# Patient Record
Sex: FEMALE | Race: WHITE | Employment: PART TIME | URBAN - METROPOLITAN AREA
[De-identification: names, ages, dates, MRNs, and addresses within clinical notes are randomized per-mention and may not be internally consistent; named-entity substitution may affect disease eponyms.]

---

## 2019-03-01 ENCOUNTER — HOSPITAL ENCOUNTER (INPATIENT)
Age: 34
LOS: 3 days | Discharge: HOME | DRG: 854 | End: 2019-03-04
Attending: EMERGENCY MEDICINE | Admitting: INTERNAL MEDICINE
Payer: COMMERCIAL

## 2019-03-01 ENCOUNTER — APPOINTMENT (OUTPATIENT)
Dept: CT IMAGING | Age: 34
DRG: 854 | End: 2019-03-01
Attending: EMERGENCY MEDICINE
Payer: COMMERCIAL

## 2019-03-01 DIAGNOSIS — N10 PYELONEPHRITIS, ACUTE: Primary | ICD-10-CM

## 2019-03-01 DIAGNOSIS — N20.1 URETERAL CALCULUS, RIGHT: ICD-10-CM

## 2019-03-01 PROBLEM — A41.9 SEPSIS (HCC): Status: ACTIVE | Noted: 2019-03-01

## 2019-03-01 LAB
ALBUMIN SERPL-MCNC: 3.8 G/DL (ref 3.5–5)
ALBUMIN/GLOB SERPL: 1 {RATIO} (ref 1.1–2.2)
ALP SERPL-CCNC: 52 U/L (ref 45–117)
ALT SERPL-CCNC: 28 U/L (ref 12–78)
ANION GAP SERPL CALC-SCNC: 9 MMOL/L (ref 5–15)
APPEARANCE UR: ABNORMAL
AST SERPL-CCNC: 15 U/L (ref 15–37)
BACTERIA URNS QL MICRO: ABNORMAL /HPF
BASOPHILS # BLD: 0.1 K/UL (ref 0–0.1)
BASOPHILS NFR BLD: 0 % (ref 0–1)
BILIRUB SERPL-MCNC: 0.4 MG/DL (ref 0.2–1)
BILIRUB UR QL: NEGATIVE
BUN SERPL-MCNC: 15 MG/DL (ref 6–20)
BUN/CREAT SERPL: 15 (ref 12–20)
CALCIUM SERPL-MCNC: 8.8 MG/DL (ref 8.5–10.1)
CHLORIDE SERPL-SCNC: 106 MMOL/L (ref 97–108)
CO2 SERPL-SCNC: 22 MMOL/L (ref 21–32)
COLOR UR: ABNORMAL
CREAT SERPL-MCNC: 1.03 MG/DL (ref 0.55–1.02)
DIFFERENTIAL METHOD BLD: ABNORMAL
EOSINOPHIL # BLD: 0.1 K/UL (ref 0–0.4)
EOSINOPHIL NFR BLD: 1 % (ref 0–7)
EPITH CASTS URNS QL MICRO: ABNORMAL /LPF
ERYTHROCYTE [DISTWIDTH] IN BLOOD BY AUTOMATED COUNT: 13.4 % (ref 11.5–14.5)
GLOBULIN SER CALC-MCNC: 4 G/DL (ref 2–4)
GLUCOSE SERPL-MCNC: 110 MG/DL (ref 65–100)
GLUCOSE UR STRIP.AUTO-MCNC: NEGATIVE MG/DL
HCG UR QL: NEGATIVE
HCT VFR BLD AUTO: 42.2 % (ref 35–47)
HGB BLD-MCNC: 14.6 G/DL (ref 11.5–16)
HGB UR QL STRIP: ABNORMAL
IMM GRANULOCYTES # BLD AUTO: 0.1 K/UL (ref 0–0.04)
IMM GRANULOCYTES NFR BLD AUTO: 0 % (ref 0–0.5)
KETONES UR QL STRIP.AUTO: >80 MG/DL
LACTATE SERPL-SCNC: 1 MMOL/L (ref 0.4–2)
LEUKOCYTE ESTERASE UR QL STRIP.AUTO: ABNORMAL
LYMPHOCYTES # BLD: 2.4 K/UL (ref 0.8–3.5)
LYMPHOCYTES NFR BLD: 14 % (ref 12–49)
MCH RBC QN AUTO: 29.8 PG (ref 26–34)
MCHC RBC AUTO-ENTMCNC: 34.6 G/DL (ref 30–36.5)
MCV RBC AUTO: 86.1 FL (ref 80–99)
MONOCYTES # BLD: 0.9 K/UL (ref 0–1)
MONOCYTES NFR BLD: 5 % (ref 5–13)
MUCOUS THREADS URNS QL MICRO: ABNORMAL /LPF
NEUTS SEG # BLD: 13.9 K/UL (ref 1.8–8)
NEUTS SEG NFR BLD: 80 % (ref 32–75)
NITRITE UR QL STRIP.AUTO: NEGATIVE
NRBC # BLD: 0 K/UL (ref 0–0.01)
NRBC BLD-RTO: 0 PER 100 WBC
PH UR STRIP: 6 [PH] (ref 5–8)
PLATELET # BLD AUTO: 368 K/UL (ref 150–400)
PMV BLD AUTO: 11.8 FL (ref 8.9–12.9)
POTASSIUM SERPL-SCNC: 3.5 MMOL/L (ref 3.5–5.1)
PROT SERPL-MCNC: 7.8 G/DL (ref 6.4–8.2)
PROT UR STRIP-MCNC: 30 MG/DL
RBC # BLD AUTO: 4.9 M/UL (ref 3.8–5.2)
RBC #/AREA URNS HPF: >100 /HPF (ref 0–5)
SODIUM SERPL-SCNC: 137 MMOL/L (ref 136–145)
SP GR UR REFRACTOMETRY: 1.03 (ref 1–1.03)
UA: UC IF INDICATED,UAUC: ABNORMAL
UROBILINOGEN UR QL STRIP.AUTO: 0.2 EU/DL (ref 0.2–1)
WBC # BLD AUTO: 17.5 K/UL (ref 3.6–11)
WBC URNS QL MICRO: ABNORMAL /HPF (ref 0–4)

## 2019-03-01 PROCEDURE — 74011250636 HC RX REV CODE- 250/636

## 2019-03-01 PROCEDURE — 65660000000 HC RM CCU STEPDOWN

## 2019-03-01 PROCEDURE — 74011250636 HC RX REV CODE- 250/636: Performed by: PHYSICIAN ASSISTANT

## 2019-03-01 PROCEDURE — 74011250636 HC RX REV CODE- 250/636: Performed by: EMERGENCY MEDICINE

## 2019-03-01 PROCEDURE — 81025 URINE PREGNANCY TEST: CPT

## 2019-03-01 PROCEDURE — 85025 COMPLETE CBC W/AUTO DIFF WBC: CPT

## 2019-03-01 PROCEDURE — 74011636320 HC RX REV CODE- 636/320: Performed by: EMERGENCY MEDICINE

## 2019-03-01 PROCEDURE — 87086 URINE CULTURE/COLONY COUNT: CPT

## 2019-03-01 PROCEDURE — 83605 ASSAY OF LACTIC ACID: CPT

## 2019-03-01 PROCEDURE — 96361 HYDRATE IV INFUSION ADD-ON: CPT

## 2019-03-01 PROCEDURE — 99285 EMERGENCY DEPT VISIT HI MDM: CPT

## 2019-03-01 PROCEDURE — 36415 COLL VENOUS BLD VENIPUNCTURE: CPT

## 2019-03-01 PROCEDURE — 96376 TX/PRO/DX INJ SAME DRUG ADON: CPT

## 2019-03-01 PROCEDURE — 96365 THER/PROPH/DIAG IV INF INIT: CPT

## 2019-03-01 PROCEDURE — 96375 TX/PRO/DX INJ NEW DRUG ADDON: CPT

## 2019-03-01 PROCEDURE — 74177 CT ABD & PELVIS W/CONTRAST: CPT

## 2019-03-01 PROCEDURE — 80053 COMPREHEN METABOLIC PANEL: CPT

## 2019-03-01 PROCEDURE — 81001 URINALYSIS AUTO W/SCOPE: CPT

## 2019-03-01 RX ORDER — HYDROMORPHONE HYDROCHLORIDE 1 MG/ML
0.5 INJECTION, SOLUTION INTRAMUSCULAR; INTRAVENOUS; SUBCUTANEOUS
Status: DISCONTINUED | OUTPATIENT
Start: 2019-03-01 | End: 2019-03-04 | Stop reason: HOSPADM

## 2019-03-01 RX ORDER — MORPHINE SULFATE 4 MG/ML
INJECTION INTRAVENOUS
Status: DISCONTINUED
Start: 2019-03-01 | End: 2019-03-02

## 2019-03-01 RX ORDER — OXYCODONE AND ACETAMINOPHEN 5; 325 MG/1; MG/1
1 TABLET ORAL
Status: DISCONTINUED | OUTPATIENT
Start: 2019-03-01 | End: 2019-03-04 | Stop reason: HOSPADM

## 2019-03-01 RX ORDER — SODIUM CHLORIDE 0.9 % (FLUSH) 0.9 %
10 SYRINGE (ML) INJECTION
Status: COMPLETED | OUTPATIENT
Start: 2019-03-01 | End: 2019-03-01

## 2019-03-01 RX ORDER — ACETAMINOPHEN 325 MG/1
650 TABLET ORAL
Status: DISCONTINUED | OUTPATIENT
Start: 2019-03-01 | End: 2019-03-04 | Stop reason: HOSPADM

## 2019-03-01 RX ORDER — SODIUM CHLORIDE 9 MG/ML
150 INJECTION, SOLUTION INTRAVENOUS CONTINUOUS
Status: DISCONTINUED | OUTPATIENT
Start: 2019-03-01 | End: 2019-03-03

## 2019-03-01 RX ORDER — FACIAL-BODY WIPES
10 EACH TOPICAL DAILY PRN
Status: DISCONTINUED | OUTPATIENT
Start: 2019-03-01 | End: 2019-03-04 | Stop reason: HOSPADM

## 2019-03-01 RX ORDER — MORPHINE SULFATE 2 MG/ML
4 INJECTION, SOLUTION INTRAMUSCULAR; INTRAVENOUS
Status: COMPLETED | OUTPATIENT
Start: 2019-03-01 | End: 2019-03-01

## 2019-03-01 RX ORDER — ENOXAPARIN SODIUM 100 MG/ML
40 INJECTION SUBCUTANEOUS EVERY 24 HOURS
Status: DISCONTINUED | OUTPATIENT
Start: 2019-03-02 | End: 2019-03-02

## 2019-03-01 RX ORDER — ONDANSETRON 2 MG/ML
4 INJECTION INTRAMUSCULAR; INTRAVENOUS
Status: COMPLETED | OUTPATIENT
Start: 2019-03-01 | End: 2019-03-01

## 2019-03-01 RX ORDER — ONDANSETRON 2 MG/ML
4 INJECTION INTRAMUSCULAR; INTRAVENOUS
Status: DISCONTINUED | OUTPATIENT
Start: 2019-03-01 | End: 2019-03-04 | Stop reason: HOSPADM

## 2019-03-01 RX ORDER — KETOROLAC TROMETHAMINE 30 MG/ML
30 INJECTION, SOLUTION INTRAMUSCULAR; INTRAVENOUS
Status: COMPLETED | OUTPATIENT
Start: 2019-03-01 | End: 2019-03-01

## 2019-03-01 RX ORDER — LEVOFLOXACIN 5 MG/ML
750 INJECTION, SOLUTION INTRAVENOUS
Status: COMPLETED | OUTPATIENT
Start: 2019-03-01 | End: 2019-03-01

## 2019-03-01 RX ORDER — NALOXONE HYDROCHLORIDE 0.4 MG/ML
0.4 INJECTION, SOLUTION INTRAMUSCULAR; INTRAVENOUS; SUBCUTANEOUS AS NEEDED
Status: DISCONTINUED | OUTPATIENT
Start: 2019-03-01 | End: 2019-03-04 | Stop reason: HOSPADM

## 2019-03-01 RX ORDER — SODIUM CHLORIDE 0.9 % (FLUSH) 0.9 %
5-40 SYRINGE (ML) INJECTION AS NEEDED
Status: DISCONTINUED | OUTPATIENT
Start: 2019-03-01 | End: 2019-03-04 | Stop reason: HOSPADM

## 2019-03-01 RX ORDER — SODIUM CHLORIDE 0.9 % (FLUSH) 0.9 %
5-40 SYRINGE (ML) INJECTION EVERY 8 HOURS
Status: DISCONTINUED | OUTPATIENT
Start: 2019-03-01 | End: 2019-03-04 | Stop reason: HOSPADM

## 2019-03-01 RX ADMIN — LEVOFLOXACIN 750 MG: 5 INJECTION, SOLUTION INTRAVENOUS at 21:10

## 2019-03-01 RX ADMIN — KETOROLAC TROMETHAMINE 30 MG: 30 INJECTION, SOLUTION INTRAMUSCULAR; INTRAVENOUS at 19:42

## 2019-03-01 RX ADMIN — SODIUM CHLORIDE 150 ML/HR: 900 INJECTION, SOLUTION INTRAVENOUS at 23:15

## 2019-03-01 RX ADMIN — Medication 10 ML: at 22:19

## 2019-03-01 RX ADMIN — ONDANSETRON 4 MG: 2 INJECTION INTRAMUSCULAR; INTRAVENOUS at 20:00

## 2019-03-01 RX ADMIN — SODIUM CHLORIDE 1000 ML: 900 INJECTION, SOLUTION INTRAVENOUS at 19:58

## 2019-03-01 RX ADMIN — MORPHINE SULFATE 4 MG: 2 INJECTION, SOLUTION INTRAMUSCULAR; INTRAVENOUS at 21:27

## 2019-03-01 RX ADMIN — IOPAMIDOL 100 ML: 755 INJECTION, SOLUTION INTRAVENOUS at 22:19

## 2019-03-01 RX ADMIN — MORPHINE SULFATE 4 MG: 2 INJECTION, SOLUTION INTRAMUSCULAR; INTRAVENOUS at 20:01

## 2019-03-01 NOTE — LETTER
NOTIFICATION RETURN TO WORK / SCHOOL 
 
3/4/2019 7:52 AM 
 
Ms. Toni Skelton [address] [facility] [id number] To Whom It May Concern: 
 
Toin Skelton is currently receiving care at [facility]. She is medically cleared to return to work on March 11, 2019. If there are questions or concerns please have the patient contact our office. Sincerely, Toshia Wilson MD 
Hospitalist 
[facility]

## 2019-03-02 ENCOUNTER — ANESTHESIA (OUTPATIENT)
Dept: SURGERY | Age: 34
DRG: 854 | End: 2019-03-02
Payer: COMMERCIAL

## 2019-03-02 ENCOUNTER — ANESTHESIA EVENT (OUTPATIENT)
Dept: SURGERY | Age: 34
DRG: 854 | End: 2019-03-02
Payer: COMMERCIAL

## 2019-03-02 ENCOUNTER — APPOINTMENT (OUTPATIENT)
Dept: GENERAL RADIOLOGY | Age: 34
DRG: 854 | End: 2019-03-02
Attending: UROLOGY
Payer: COMMERCIAL

## 2019-03-02 LAB
ALBUMIN SERPL-MCNC: 3.2 G/DL (ref 3.5–5)
ALBUMIN/GLOB SERPL: 0.9 {RATIO} (ref 1.1–2.2)
ALP SERPL-CCNC: 42 U/L (ref 45–117)
ALT SERPL-CCNC: 22 U/L (ref 12–78)
ANION GAP SERPL CALC-SCNC: 12 MMOL/L (ref 5–15)
AST SERPL-CCNC: 15 U/L (ref 15–37)
BASOPHILS # BLD: 0.1 K/UL (ref 0–0.1)
BASOPHILS NFR BLD: 0 % (ref 0–1)
BILIRUB SERPL-MCNC: 0.4 MG/DL (ref 0.2–1)
BUN SERPL-MCNC: 14 MG/DL (ref 6–20)
BUN/CREAT SERPL: 14 (ref 12–20)
CALCIUM SERPL-MCNC: 8.1 MG/DL (ref 8.5–10.1)
CHLORIDE SERPL-SCNC: 108 MMOL/L (ref 97–108)
CO2 SERPL-SCNC: 19 MMOL/L (ref 21–32)
CREAT SERPL-MCNC: 1 MG/DL (ref 0.55–1.02)
DIFFERENTIAL METHOD BLD: ABNORMAL
EOSINOPHIL # BLD: 0.1 K/UL (ref 0–0.4)
EOSINOPHIL NFR BLD: 1 % (ref 0–7)
ERYTHROCYTE [DISTWIDTH] IN BLOOD BY AUTOMATED COUNT: 13.5 % (ref 11.5–14.5)
GLOBULIN SER CALC-MCNC: 3.5 G/DL (ref 2–4)
GLUCOSE SERPL-MCNC: 104 MG/DL (ref 65–100)
HCT VFR BLD AUTO: 38.6 % (ref 35–47)
HGB BLD-MCNC: 13.1 G/DL (ref 11.5–16)
IMM GRANULOCYTES # BLD AUTO: 0.1 K/UL (ref 0–0.04)
IMM GRANULOCYTES NFR BLD AUTO: 0 % (ref 0–0.5)
LYMPHOCYTES # BLD: 2.6 K/UL (ref 0.8–3.5)
LYMPHOCYTES NFR BLD: 14 % (ref 12–49)
MCH RBC QN AUTO: 30.1 PG (ref 26–34)
MCHC RBC AUTO-ENTMCNC: 33.9 G/DL (ref 30–36.5)
MCV RBC AUTO: 88.7 FL (ref 80–99)
MONOCYTES # BLD: 1.6 K/UL (ref 0–1)
MONOCYTES NFR BLD: 8 % (ref 5–13)
NEUTS SEG # BLD: 14.4 K/UL (ref 1.8–8)
NEUTS SEG NFR BLD: 77 % (ref 32–75)
NRBC # BLD: 0 K/UL (ref 0–0.01)
NRBC BLD-RTO: 0 PER 100 WBC
PLATELET # BLD AUTO: 298 K/UL (ref 150–400)
PMV BLD AUTO: 11.7 FL (ref 8.9–12.9)
POTASSIUM SERPL-SCNC: 3.6 MMOL/L (ref 3.5–5.1)
PROT SERPL-MCNC: 6.7 G/DL (ref 6.4–8.2)
RBC # BLD AUTO: 4.35 M/UL (ref 3.8–5.2)
SODIUM SERPL-SCNC: 139 MMOL/L (ref 136–145)
WBC # BLD AUTO: 18.9 K/UL (ref 3.6–11)

## 2019-03-02 PROCEDURE — C2617 STENT, NON-COR, TEM W/O DEL: HCPCS | Performed by: UROLOGY

## 2019-03-02 PROCEDURE — 94760 N-INVAS EAR/PLS OXIMETRY 1: CPT

## 2019-03-02 PROCEDURE — 77030033269 HC SLV COMPR SCD KNE2 CARD -B

## 2019-03-02 PROCEDURE — 74011250636 HC RX REV CODE- 250/636: Performed by: EMERGENCY MEDICINE

## 2019-03-02 PROCEDURE — 74011000258 HC RX REV CODE- 258: Performed by: INTERNAL MEDICINE

## 2019-03-02 PROCEDURE — 0T768DZ DILATION OF RIGHT URETER WITH INTRALUMINAL DEVICE, VIA NATURAL OR ARTIFICIAL OPENING ENDOSCOPIC: ICD-10-PCS | Performed by: UROLOGY

## 2019-03-02 PROCEDURE — 77030018832 HC SOL IRR H20 ICUM -A: Performed by: UROLOGY

## 2019-03-02 PROCEDURE — 36415 COLL VENOUS BLD VENIPUNCTURE: CPT

## 2019-03-02 PROCEDURE — 74011250636 HC RX REV CODE- 250/636: Performed by: ANESTHESIOLOGY

## 2019-03-02 PROCEDURE — 74011250636 HC RX REV CODE- 250/636

## 2019-03-02 PROCEDURE — 85025 COMPLETE CBC W/AUTO DIFF WBC: CPT

## 2019-03-02 PROCEDURE — C1769 GUIDE WIRE: HCPCS | Performed by: UROLOGY

## 2019-03-02 PROCEDURE — 76060000032 HC ANESTHESIA 0.5 TO 1 HR: Performed by: UROLOGY

## 2019-03-02 PROCEDURE — 74011250637 HC RX REV CODE- 250/637: Performed by: INTERNAL MEDICINE

## 2019-03-02 PROCEDURE — 77030011992 HC AIRWY NASOPHGL TELE -A: Performed by: ANESTHESIOLOGY

## 2019-03-02 PROCEDURE — 76210000063 HC OR PH I REC FIRST 0.5 HR: Performed by: UROLOGY

## 2019-03-02 PROCEDURE — 77030019927 HC TBNG IRR CYSTO BAXT -A: Performed by: UROLOGY

## 2019-03-02 PROCEDURE — 74430 CONTRAST X-RAY BLADDER: CPT

## 2019-03-02 PROCEDURE — 65660000000 HC RM CCU STEPDOWN

## 2019-03-02 PROCEDURE — 80053 COMPREHEN METABOLIC PANEL: CPT

## 2019-03-02 PROCEDURE — 76010000138 HC OR TIME 0.5 TO 1 HR: Performed by: UROLOGY

## 2019-03-02 PROCEDURE — 77030032490 HC SLV COMPR SCD KNE COVD -B: Performed by: UROLOGY

## 2019-03-02 PROCEDURE — 74011250636 HC RX REV CODE- 250/636: Performed by: INTERNAL MEDICINE

## 2019-03-02 RX ORDER — AMLODIPINE BESYLATE 5 MG/1
5 TABLET ORAL DAILY
Status: DISCONTINUED | OUTPATIENT
Start: 2019-03-02 | End: 2019-03-04 | Stop reason: HOSPADM

## 2019-03-02 RX ORDER — HYDROCODONE BITARTRATE AND ACETAMINOPHEN 7.5; 325 MG/1; MG/1
1-2 TABLET ORAL
Status: CANCELLED | OUTPATIENT
Start: 2019-03-02

## 2019-03-02 RX ORDER — HYDROMORPHONE HYDROCHLORIDE 1 MG/ML
0.5 INJECTION, SOLUTION INTRAMUSCULAR; INTRAVENOUS; SUBCUTANEOUS
Status: DISCONTINUED | OUTPATIENT
Start: 2019-03-02 | End: 2019-03-04 | Stop reason: HOSPADM

## 2019-03-02 RX ORDER — DESOGESTREL AND ETHINYL ESTRADIOL 0.15-0.03
1 KIT ORAL DAILY
COMMUNITY

## 2019-03-02 RX ORDER — SODIUM CHLORIDE, SODIUM LACTATE, POTASSIUM CHLORIDE, CALCIUM CHLORIDE 600; 310; 30; 20 MG/100ML; MG/100ML; MG/100ML; MG/100ML
25 INJECTION, SOLUTION INTRAVENOUS CONTINUOUS
Status: DISCONTINUED | OUTPATIENT
Start: 2019-03-02 | End: 2019-03-02 | Stop reason: HOSPADM

## 2019-03-02 RX ORDER — HYDROCHLOROTHIAZIDE 25 MG/1
25 TABLET ORAL DAILY
COMMUNITY

## 2019-03-02 RX ORDER — FENTANYL CITRATE 50 UG/ML
50 INJECTION, SOLUTION INTRAMUSCULAR; INTRAVENOUS AS NEEDED
Status: DISCONTINUED | OUTPATIENT
Start: 2019-03-02 | End: 2019-03-02 | Stop reason: HOSPADM

## 2019-03-02 RX ORDER — AMLODIPINE BESYLATE 5 MG/1
5 TABLET ORAL DAILY
Status: CANCELLED | OUTPATIENT
Start: 2019-03-02

## 2019-03-02 RX ORDER — FENTANYL CITRATE 50 UG/ML
25 INJECTION, SOLUTION INTRAMUSCULAR; INTRAVENOUS
Status: DISCONTINUED | OUTPATIENT
Start: 2019-03-02 | End: 2019-03-02 | Stop reason: HOSPADM

## 2019-03-02 RX ORDER — IPRATROPIUM BROMIDE 42 UG/1
2 SPRAY, METERED NASAL 4 TIMES DAILY
COMMUNITY

## 2019-03-02 RX ORDER — MIDAZOLAM HYDROCHLORIDE 1 MG/ML
INJECTION, SOLUTION INTRAMUSCULAR; INTRAVENOUS AS NEEDED
Status: DISCONTINUED | OUTPATIENT
Start: 2019-03-02 | End: 2019-03-02 | Stop reason: HOSPADM

## 2019-03-02 RX ORDER — ONDANSETRON 2 MG/ML
4 INJECTION INTRAMUSCULAR; INTRAVENOUS AS NEEDED
Status: DISCONTINUED | OUTPATIENT
Start: 2019-03-02 | End: 2019-03-02 | Stop reason: HOSPADM

## 2019-03-02 RX ORDER — PROPOFOL 10 MG/ML
INJECTION, EMULSION INTRAVENOUS AS NEEDED
Status: DISCONTINUED | OUTPATIENT
Start: 2019-03-02 | End: 2019-03-02 | Stop reason: HOSPADM

## 2019-03-02 RX ORDER — ALBUTEROL SULFATE 0.83 MG/ML
2.5 SOLUTION RESPIRATORY (INHALATION)
Status: DISCONTINUED | OUTPATIENT
Start: 2019-03-02 | End: 2019-03-04 | Stop reason: HOSPADM

## 2019-03-02 RX ORDER — FENTANYL CITRATE 50 UG/ML
INJECTION, SOLUTION INTRAMUSCULAR; INTRAVENOUS AS NEEDED
Status: DISCONTINUED | OUTPATIENT
Start: 2019-03-02 | End: 2019-03-02 | Stop reason: HOSPADM

## 2019-03-02 RX ORDER — MIDAZOLAM HYDROCHLORIDE 1 MG/ML
1 INJECTION, SOLUTION INTRAMUSCULAR; INTRAVENOUS AS NEEDED
Status: DISCONTINUED | OUTPATIENT
Start: 2019-03-02 | End: 2019-03-02 | Stop reason: HOSPADM

## 2019-03-02 RX ORDER — METFORMIN HYDROCHLORIDE 500 MG/1
500 TABLET ORAL
COMMUNITY

## 2019-03-02 RX ORDER — BUDESONIDE AND FORMOTEROL FUMARATE DIHYDRATE 160; 4.5 UG/1; UG/1
2 AEROSOL RESPIRATORY (INHALATION) 2 TIMES DAILY
COMMUNITY

## 2019-03-02 RX ORDER — DESOGESTREL AND ETHINYL ESTRADIOL 0.15-0.03
1 KIT ORAL DAILY
Status: ON HOLD | COMMUNITY
End: 2019-03-04

## 2019-03-02 RX ORDER — LIDOCAINE HYDROCHLORIDE 10 MG/ML
0.1 INJECTION, SOLUTION EPIDURAL; INFILTRATION; INTRACAUDAL; PERINEURAL AS NEEDED
Status: DISCONTINUED | OUTPATIENT
Start: 2019-03-02 | End: 2019-03-02 | Stop reason: HOSPADM

## 2019-03-02 RX ORDER — FLUTICASONE FUROATE AND VILANTEROL 100; 25 UG/1; UG/1
1 POWDER RESPIRATORY (INHALATION) DAILY
Status: DISCONTINUED | OUTPATIENT
Start: 2019-03-02 | End: 2019-03-04 | Stop reason: HOSPADM

## 2019-03-02 RX ORDER — MORPHINE SULFATE 10 MG/ML
2 INJECTION, SOLUTION INTRAMUSCULAR; INTRAVENOUS
Status: DISCONTINUED | OUTPATIENT
Start: 2019-03-02 | End: 2019-03-02 | Stop reason: HOSPADM

## 2019-03-02 RX ORDER — PROPOFOL 10 MG/ML
INJECTION, EMULSION INTRAVENOUS
Status: DISCONTINUED | OUTPATIENT
Start: 2019-03-02 | End: 2019-03-02 | Stop reason: HOSPADM

## 2019-03-02 RX ORDER — ENOXAPARIN SODIUM 100 MG/ML
40 INJECTION SUBCUTANEOUS EVERY 24 HOURS
Status: DISCONTINUED | OUTPATIENT
Start: 2019-03-02 | End: 2019-03-03

## 2019-03-02 RX ORDER — SODIUM CHLORIDE, SODIUM LACTATE, POTASSIUM CHLORIDE, CALCIUM CHLORIDE 600; 310; 30; 20 MG/100ML; MG/100ML; MG/100ML; MG/100ML
INJECTION, SOLUTION INTRAVENOUS
Status: DISCONTINUED | OUTPATIENT
Start: 2019-03-02 | End: 2019-03-02 | Stop reason: HOSPADM

## 2019-03-02 RX ADMIN — PROPOFOL 15 MG: 10 INJECTION, EMULSION INTRAVENOUS at 10:27

## 2019-03-02 RX ADMIN — ONDANSETRON 4 MG: 2 INJECTION INTRAMUSCULAR; INTRAVENOUS at 04:44

## 2019-03-02 RX ADMIN — FENTANYL CITRATE 25 MCG: 50 INJECTION, SOLUTION INTRAMUSCULAR; INTRAVENOUS at 10:00

## 2019-03-02 RX ADMIN — AZTREONAM 1 G: 1 INJECTION, POWDER, LYOPHILIZED, FOR SOLUTION INTRAMUSCULAR; INTRAVENOUS at 06:40

## 2019-03-02 RX ADMIN — MIDAZOLAM HYDROCHLORIDE 2 MG: 1 INJECTION, SOLUTION INTRAMUSCULAR; INTRAVENOUS at 10:27

## 2019-03-02 RX ADMIN — SODIUM CHLORIDE, SODIUM LACTATE, POTASSIUM CHLORIDE, CALCIUM CHLORIDE: 600; 310; 30; 20 INJECTION, SOLUTION INTRAVENOUS at 09:43

## 2019-03-02 RX ADMIN — Medication 10 ML: at 01:12

## 2019-03-02 RX ADMIN — ONDANSETRON 4 MG: 2 INJECTION INTRAMUSCULAR; INTRAVENOUS at 01:11

## 2019-03-02 RX ADMIN — ACETAMINOPHEN 650 MG: 325 TABLET ORAL at 16:08

## 2019-03-02 RX ADMIN — HYDROMORPHONE HYDROCHLORIDE 0.5 MG: 1 INJECTION, SOLUTION INTRAMUSCULAR; INTRAVENOUS; SUBCUTANEOUS at 04:44

## 2019-03-02 RX ADMIN — SODIUM CHLORIDE 150 ML/HR: 900 INJECTION, SOLUTION INTRAVENOUS at 18:41

## 2019-03-02 RX ADMIN — Medication 10 ML: at 16:08

## 2019-03-02 RX ADMIN — HYDROMORPHONE HYDROCHLORIDE 0.5 MG: 1 INJECTION, SOLUTION INTRAMUSCULAR; INTRAVENOUS; SUBCUTANEOUS at 07:53

## 2019-03-02 RX ADMIN — AMLODIPINE BESYLATE 5 MG: 5 TABLET ORAL at 12:57

## 2019-03-02 RX ADMIN — PROPOFOL 20 MG: 10 INJECTION, EMULSION INTRAVENOUS at 10:33

## 2019-03-02 RX ADMIN — MIDAZOLAM HYDROCHLORIDE 2 MG: 1 INJECTION, SOLUTION INTRAMUSCULAR; INTRAVENOUS at 10:17

## 2019-03-02 RX ADMIN — SODIUM CHLORIDE 150 ML/HR: 900 INJECTION, SOLUTION INTRAVENOUS at 06:39

## 2019-03-02 RX ADMIN — PROPOFOL 20 MG: 10 INJECTION, EMULSION INTRAVENOUS at 10:39

## 2019-03-02 RX ADMIN — HYDROMORPHONE HYDROCHLORIDE 0.5 MG: 1 INJECTION, SOLUTION INTRAMUSCULAR; INTRAVENOUS; SUBCUTANEOUS at 01:11

## 2019-03-02 RX ADMIN — PROPOFOL 75 MCG/KG/MIN: 10 INJECTION, EMULSION INTRAVENOUS at 10:23

## 2019-03-02 RX ADMIN — PROPOFOL 20 MG: 10 INJECTION, EMULSION INTRAVENOUS at 10:45

## 2019-03-02 RX ADMIN — ENOXAPARIN SODIUM 40 MG: 40 INJECTION SUBCUTANEOUS at 16:08

## 2019-03-02 RX ADMIN — ACETAMINOPHEN 650 MG: 325 TABLET ORAL at 23:45

## 2019-03-02 RX ADMIN — AZTREONAM 1 G: 1 INJECTION, POWDER, LYOPHILIZED, FOR SOLUTION INTRAMUSCULAR; INTRAVENOUS at 16:13

## 2019-03-02 RX ADMIN — HYDROMORPHONE HYDROCHLORIDE 0.5 MG: 1 INJECTION, SOLUTION INTRAMUSCULAR; INTRAVENOUS; SUBCUTANEOUS at 18:16

## 2019-03-02 RX ADMIN — FENTANYL CITRATE 50 MCG: 50 INJECTION, SOLUTION INTRAMUSCULAR; INTRAVENOUS at 10:27

## 2019-03-02 RX ADMIN — FENTANYL CITRATE 50 MCG: 50 INJECTION, SOLUTION INTRAMUSCULAR; INTRAVENOUS at 10:24

## 2019-03-02 RX ADMIN — Medication 10 ML: at 23:12

## 2019-03-02 NOTE — PERIOP NOTES
TRANSFER - IN REPORT: 
 
Verbal report received from [facility] RN(name) on Karely Alford  being received from 1140(unit) for ordered procedure Report consisted of patients Situation, Background, Assessment and  
Recommendations(SBAR). Information from the following report(s) SBAR, Kardex, ED Summary, OR Summary, Procedure Summary, Intake/Output, MAR, Accordion, Recent Results, Med Rec Status, Cardiac Rhythm No Tele, Alarm Parameters , Pre Procedure Checklist, Procedure Verification and Quality Measures was reviewed with the receiving nurse. Opportunity for questions and clarification was provided. Assessment completed upon patients arrival to unit and care assumed. Addendum - NSR on Tele

## 2019-03-02 NOTE — BRIEF OP NOTE
BRIEF OPERATIVE NOTE Date of Procedure: 3/2/2019 Preoperative Diagnosis: STONE Postoperative Diagnosis: right pyonephrosis Procedure(s): RIGHT RETROGRADE CYSTOSCOPY URETERAL STENT INSERTION Surgeon(s) and Role: Whit Webb MD - Primary Surgical Assistant: none Surgical Staff: 
Circ-1: Abilio Vasquez RN Surg Asst-1: Bennett Shoe Event Time In Time Out Incision Start 03/02/2019 1035 Incision Close 03/02/2019 1040 Anesthesia: MAC Estimated Blood Loss: none Specimens: * No specimens in log * Findings: 6Fr x 24cm double-J placed Complications: none Disposition: I feel she can go home when stable on PO antibiotics(?Levaquin 500mg x 10days) and pain meds to followup with Dr Nito Burns or [address] [place] [address]. Please call with any questions. Implants: * No implants in log *

## 2019-03-02 NOTE — ED NOTES
Assumed care of pt. Pt c/o flank pain on right side. Pt reports pain has been there since 1400. Pt reports no hx of kidney stones. Pt reports no urinary symptoms.

## 2019-03-02 NOTE — CONSULTS
Urology Consult    Patient: Adi Daniel MRN: [id number]  SSN: [id number]    YOB: 1985  Age: 34 y.o. Sex: female          Date of Consultation:  2019  Requesting Physician: Jacquie Mathews MD  Reason for Consultation:  Right pyonephrosis  Pre-existing [facility] Patient:   Physician:         History of Present Illness:  Patient is a 34 y.o. female admitted 3/1/2019 [facility] [A41.9]. She notes several day history of right flank pain and recent chills. Past Medical History: Allergies   Allergen Reactions    Ceclor [Cefaclor] Unknown (comments)      Prior to Admission medications    Medication Sig Start Date End Date Taking? Authorizing Provider   hydroCHLOROthiazide (HYDRODIURIL) 25 mg tablet Take 25 mg by mouth daily. Yes Provider, Historical   budesonide-formoterol (SYMBICORT) 160-4.5 mcg/actuation HFAA Take 2 Puffs by inhalation two (2) times a day. Yes Provider, Historical   metFORMIN (GLUCOPHAGE) 500 mg tablet Take 500 mg by mouth two (2) times daily (with meals). Yes Provider, Historical   desogestrel-ethinyl estradiol (DESOGEN) 0.15-0.03 mg tab Take 1 Tab by mouth daily. Yes Provider, Historical   desogestrel-ethinyl estradiol (APRI) 0.15-0.03 mg tab Take 1 Tab by mouth daily. Yes Provider, Historical   ipratropium (ATROVENT) 42 mcg (0.06 %) nasal spray 2 Sprays by Both Nostrils route four (4) times daily. Yes Provider, Historical      PMHx:  has a past medical history of Asthma. PSurgHx:  has no past surgical history on file. PSocHx:  reports that she quit smoking about 8 years ago. She does not have any smokeless tobacco history on file. She reports that she drinks alcohol. She reports that she does not use drugs. ROS:  Admission ROS by Olam Cushing, MD from 3/1/2019 were reviewed with the patient and changes (other than per HPI) include: none.     Physical Exam:            Vitals[de-identified]    Temp (24hrs), Av °F (36.7 °C), Min:97.8 °F (36.6 °C), Max:98.2 °F (36.8 °C)   Blood pressure (!) 145/93, pulse 82, temperature 98.2 °F (36.8 °C), resp. rate 18, height 5' 3\" (1.6 m), weight 104.3 kg (230 lb), last menstrual period 02/08/2019, SpO2 98 %. I&O's:    No intake/output data recorded. General:    appears nontoxic                     Skin:  no clubbing, cyanosis, edema                HEENT:  NCAT, O/P Clear        Throat/Neck:  supple, no LAD                 Chest[de-identified]  CTA      Heart[de-identified]  Regular rate and rhythm             Abdomen/Flank[de-identified]  modesr right CVAT, non-tender abdomen without masses             Bladder[de-identified]  Bladder not palpable   Lymph nodes:  no Cervical, supraclavicular, and axillary LAD     Lab Results   Component Value Date/Time    WBC 18.9 (H) 03/02/2019 04:41 AM    HCT 38.6 03/02/2019 04:41 AM    PLATELET 828 97/82/1973 04:41 AM    Sodium 139 03/02/2019 04:41 AM    Potassium 3.6 03/02/2019 04:41 AM    Chloride 108 03/02/2019 04:41 AM    CO2 19 (L) 03/02/2019 04:41 AM    BUN 14 03/02/2019 04:41 AM    Creatinine 1.00 03/02/2019 04:41 AM    Glucose 104 (H) 03/02/2019 04:41 AM    Calcium 8.1 (L) 03/02/2019 04:41 AM       UA:   Lab Results   Component Value Date/Time    Color DARK YELLOW 03/01/2019 07:47 PM    Appearance CLOUDY (A) 03/01/2019 07:47 PM    Specific gravity 1.027 03/01/2019 07:47 PM    pH (UA) 6.0 03/01/2019 07:47 PM    Protein 30 (A) 03/01/2019 07:47 PM    Glucose NEGATIVE  03/01/2019 07:47 PM    Ketone >80 (A) 03/01/2019 07:47 PM    Bilirubin NEGATIVE  03/01/2019 07:47 PM    Urobilinogen 0.2 03/01/2019 07:47 PM    Nitrites NEGATIVE  03/01/2019 07:47 PM    Leukocyte Esterase MODERATE (A) 03/01/2019 07:47 PM    Epithelial cells MODERATE (A) 03/01/2019 07:47 PM    Bacteria 2+ (A) 03/01/2019 07:47 PM    WBC 20-50 03/01/2019 07:47 PM    RBC >100 (H) 03/01/2019 07:47 PM       Cultures:   Xrays: CT reviewed, proximal right ureteral stone with obstruction. Assessment/Plan:     1.  Need to drain infected renal unit. Will perform cystoscopy and right stent placement. Risks and benefits all outlined including technical inability to perform and need to formally address stone at a later date.     Signed By: Hortensia Clement MD  - March 2, 2019

## 2019-03-02 NOTE — ANESTHESIA PREPROCEDURE EVALUATION
Anesthetic History   No history of anesthetic complications            Review of Systems / Medical History  Patient summary reviewed, nursing notes reviewed and pertinent labs reviewed    Pulmonary            Asthma : well controlled       Neuro/Psych   Within defined limits           Cardiovascular    Hypertension: well controlled              Exercise tolerance: >4 METS     GI/Hepatic/Renal  Within defined limits              Endo/Other        Morbid obesity     Other Findings            Physical Exam    Airway  Mallampati: II  TM Distance: 4 - 6 cm  Neck ROM: normal range of motion   Mouth opening: Normal     Cardiovascular  Regular rate and rhythm,  S1 and S2 normal,  no murmur, click, rub, or gallop             Dental  No notable dental hx       Pulmonary  Breath sounds clear to auscultation               Abdominal  GI exam deferred       Other Findings            Anesthetic Plan    ASA: 2  Anesthesia type: total IV anesthesia and MAC          Induction: Intravenous  Anesthetic plan and risks discussed with: Patient

## 2019-03-02 NOTE — PERIOP NOTES
Handoff Report from Operating Room to PACU Report received from Sergio Mantilla and Cyrus Prater CRNA regarding Brenda Oar. Surgeon(s): 
Glory Tang MD  And Procedure(s) (LRB): 
RIGHT RETROGRADE CYSTOSCOPY URETERAL STENT INSERTION (Right)  confirmed  
with allergies and dressings discussed. Anesthesia type, drugs, patient history, complications, estimated blood loss, vital signs, intake and output, and last pain medication were reviewed.

## 2019-03-02 NOTE — PROGRESS NOTES
OT orders received and chart was reviewed and pt reports that she is independent with ADLs and has been getting out of bed with no assist and toileting self. Pt stated that her pain is the only thing that holds her back. Pt has no OT needs at this time and was screen only and recommend that pt return home and she stated that she was having her mother at home with her for a few days when she is discharged. OT will sign off.

## 2019-03-02 NOTE — H&P
Hospitalist Admission Note NAME:  Alona Joaquin :   1985 MRN:   [id number] Date of admit: 3/1/2019 PCP: Unknown, Provider Assessment/Plan:  
 
 
Sepsis POA with WBC 17,500, HR 98, lactate 1.0 
UTI POA Obstructing 4 mm proximal right ureteral stone POA Acute onset severe right flank pain, associated with nausea and vomiting No prior kidney stones Right kidney with delayed right nephrogram and mild hydronephrosis on CT Positive CVA tenderness Admit Urine culture IVF 
PRN pain meds, monitor for side effects NPO Urology consulted in ED, will see in AM, likely needs stent IV levaquin(cephalosporin allergy) in ED, transition to atreonam due to higher resistance To quinlones in E coli till culture back Asthma POA Continue symbicort PRN albuterol Cephalosporin allergy POA Nausea as child per mother, cannot remember if any more severe manifestations We discussed use of ceftriaxone, likely low risk of reaction, less resistance We discussed use of levaquin, higher rate of resistance in E coli They opted for no cephalosporin, will change to aztreonam till culture back Obesity POA Body mass index is 40.74 kg/m². Given the patient's current clinical presentation, I have a high level of concern for decompensation if discharged from the emergency department. My assessment of this patient's clinical condition and my plan of care is as noted above. DVT prophylaxis with lovenox Code status: full code NOK: parents History CHIEF COMPLAINT: \"My right flank starting hurting really bad at 3 pm today\" HISTORY OF PRESENT ILLNESS: 
 
46-year-old white female Works as a  No history of prior nephrolithiasis Well until the day of admission 
sleeping after night shift when she suddenly woke up at 3 PM. Severe right flank pain that radiated around to her right groin 
described as a severe 15/10 sharp constant pain Associated with nausea but no vomiting Not associated with dysuria, hematuria, fevers, chills Pain persistent, so she came into the emergency room ED Afebrile, hemodynamic is stable WBC count 17.5, heart rate 98, lactate 1.0 Urinalysis nitrite negative, 20-50 WBCs, greater than 100 RBCs, 2+ bacteria CT abdomen and pelvis with obstructing 4 mm proximal right ureteral stone with delayed right nephrogram and mild hydronephrosis Urology contacted recommended admission, n.p.o. after midnight for possible stent Patient currently comfortable with IV pain medications Past Medical History:  
Diagnosis Date  Asthma Social History Tobacco Use  Smoking status: Former Smoker Last attempt to quit: 10/1/2010 Years since quittin.4 Substance Use Topics  Alcohol use: Yes Comment: socially \"twice a year\" Family history: No children HTN in parents No family history of nephrolithiasis Allergies Allergen Reactions  Ceclor [Cefaclor] Unknown (comments) Prior to Admission medications Medication Sig Start Date End Date Taking? Authorizing Provider  
hydrocodone-acetaminophen (LORTAB) 5-500 mg per tablet Take 1 Tab by mouth every four (4) hours as needed for Pain. [address]   [address], DO  
fluticasone-salmeterol (ADVAIR DISKUS) 100-50 mcg/dose diskus inhaler Take 1 Puff by inhalation every twelve (12) hours. Dana Perez MD  
fexofenadine (ALLEGRA) 180 mg tablet Take 180 mg by mouth daily. Dana Perez MD  
 
 
Review of symptoms: 
   POSITIVE= Bold  Negative = not bold General:  fever, chills, sweats Eyes:    blurred vision, eye pain, double vision ENT:    Coryza, sore throat, trouble swallowing Respiratory:   cough, sputum, SOB Cardiology:   chest pain, orthopnea, PND, edema Gastrointestinal:  abdominal pain right flank, Nausea without vomiting 
    diarrhea, constipation, melena or BRBPR Genitourinary:  Urgency, dysuria, hematuria Muskuloskeletal :  Joint redness, swelling or acute joint pain, myalgias Hematology:  easy bruising, nose or gum bleeding Dermatological: rash, ulceration Endocrine:   Polyuria or polydipsia, heat or hold intolerance Neurological:  Headache, focal motor or sensory changes Speech difficulties, memory loss Psychological: depression, agitation Objective: VITALS:   
Patient Vitals for the past 24 hrs: 
 Temp Pulse Resp BP SpO2  
19 2334 97.8 °F (36.6 °C) 96 20 107/61 100 % 19 1812 97.8 °F (36.6 °C) 98 20 118/71 100 % Temp (24hrs), Av.8 °F (36.6 °C), Min:97.8 °F (36.6 °C), Max:97.8 °F (36.6 °C) O2 Device: Room air Wt Readings from Last 12 Encounters:  
19 104.3 kg (230 lb) 11 102.1 kg (225 lb) PHYSICAL EXAM:  
General:    Alert, cooperative in no distress HEENT: Normocephalic, atraumatic    PERRL, EOMI  Sclera no icterus Nasal mucosa without masses or discharge  Hearing intact to voice Oropharynx without erythema or exudate  No thrush  Pink MM Neck:  No meningismus, trachea midline, no carotid bruits Thyroid not enlarged, no nodules or tenderness Lungs:   Clear to auscultation bilaterally. No wheezing or rales No accessory muscle use or retractions. Heart:   Regular rate and rhythm,  no murmur or gallop. No LE edema Dorsal pedis, Posterior tibial and radial pulses 2+ and symmetric Abdomen:   Soft, non-tender. Not distended. Bowel sounds normal.  
  No masses, No Hepatosplenomegaly, No Rebound or guarding Positive right CVA tenderness Lymph nodes: No cervical or inguinal MARILOU Musculoskeletal:  No Joint swelling, erythema, warmth. No Cyanosis or clubbing Skin:      No rashes Not Jaundiced   No nodules or thickening Capillary refill normal 
Neurologic: Alert and oriented X 3, follows commands Cranial nerves 2 to 12 intact Symmetric motor strength bilaterally LAB DATA REVIEWED:   
 Recent Results (from the past 12 hour(s)) CBC WITH AUTOMATED DIFF Collection Time: 03/01/19  6:34 PM  
Result Value Ref Range WBC 17.5 (H) 3.6 - 11.0 K/uL  
 RBC 4.90 3.80 - 5.20 M/uL  
 HGB 14.6 11.5 - 16.0 g/dL HCT 42.2 35.0 - 47.0 % MCV 86.1 80.0 - 99.0 FL  
 MCH 29.8 26.0 - 34.0 PG  
 MCHC 34.6 30.0 - 36.5 g/dL  
 RDW 13.4 11.5 - 14.5 % PLATELET 936 037 - 701 K/uL MPV 11.8 8.9 - 12.9 FL  
 NRBC 0.0 0  WBC ABSOLUTE NRBC 0.00 0.00 - 0.01 K/uL NEUTROPHILS 80 (H) 32 - 75 % LYMPHOCYTES 14 12 - 49 % MONOCYTES 5 5 - 13 % EOSINOPHILS 1 0 - 7 % BASOPHILS 0 0 - 1 % IMMATURE GRANULOCYTES 0 0.0 - 0.5 % ABS. NEUTROPHILS 13.9 (H) 1.8 - 8.0 K/UL  
 ABS. LYMPHOCYTES 2.4 0.8 - 3.5 K/UL  
 ABS. MONOCYTES 0.9 0.0 - 1.0 K/UL  
 ABS. EOSINOPHILS 0.1 0.0 - 0.4 K/UL  
 ABS. BASOPHILS 0.1 0.0 - 0.1 K/UL  
 ABS. IMM. GRANS. 0.1 (H) 0.00 - 0.04 K/UL  
 DF AUTOMATED METABOLIC PANEL, COMPREHENSIVE Collection Time: 03/01/19  6:34 PM  
Result Value Ref Range Sodium 137 136 - 145 mmol/L Potassium 3.5 3.5 - 5.1 mmol/L Chloride 106 97 - 108 mmol/L  
 CO2 22 21 - 32 mmol/L Anion gap 9 5 - 15 mmol/L Glucose 110 (H) 65 - 100 mg/dL BUN 15 6 - 20 MG/DL Creatinine 1.03 (H) 0.55 - 1.02 MG/DL  
 BUN/Creatinine ratio 15 12 - 20 GFR est AA >60 >60 ml/min/1.73m2 GFR est non-AA >60 >60 ml/min/1.73m2 Calcium 8.8 8.5 - 10.1 MG/DL Bilirubin, total 0.4 0.2 - 1.0 MG/DL  
 ALT (SGPT) 28 12 - 78 U/L  
 AST (SGOT) 15 15 - 37 U/L Alk. phosphatase 52 45 - 117 U/L Protein, total 7.8 6.4 - 8.2 g/dL Albumin 3.8 3.5 - 5.0 g/dL Globulin 4.0 2.0 - 4.0 g/dL A-G Ratio 1.0 (L) 1.1 - 2.2 URINALYSIS W/ REFLEX CULTURE Collection Time: 03/01/19  7:47 PM  
Result Value Ref Range Color DARK YELLOW Appearance CLOUDY (A) CLEAR Specific gravity 1.027 1.003 - 1.030    
 pH (UA) 6.0 5.0 - 8.0 Protein 30 (A) NEG mg/dL Glucose NEGATIVE  NEG mg/dL Ketone >80 (A) NEG mg/dL Bilirubin NEGATIVE  NEG Blood LARGE (A) NEG Urobilinogen 0.2 0.2 - 1.0 EU/dL Nitrites NEGATIVE  NEG Leukocyte Esterase MODERATE (A) NEG    
 WBC 20-50 0 - 4 /hpf  
 RBC >100 (H) 0 - 5 /hpf Epithelial cells MODERATE (A) FEW /lpf Bacteria 2+ (A) NEG /hpf  
 UA:UC IF INDICATED URINE CULTURE ORDERED (A) CNI Mucus 1+ (A) NEG /lpf  
HCG URINE, QL Collection Time: 03/01/19  7:47 PM  
Result Value Ref Range HCG urine, QL NEGATIVE  NEG    
LACTIC ACID Collection Time: 03/01/19  8:41 PM  
Result Value Ref Range Lactic acid 1.0 0.4 - 2.0 MMOL/L  
 
CT scan abdomen/pelvis read by by radiology FINDINGS:  
LUNG BASES: Clear. INCIDENTALLY IMAGED HEART AND MEDIASTINUM: Unremarkable. LIVER: No mass or biliary dilatation. GALLBLADDER: Unremarkable. SPLEEN: No mass. PANCREAS: No mass or ductal dilatation. ADRENALS: Unremarkable. KIDNEYS: There is delayed nephrogram on the right with mild right hydronephrosis 
and proximal hydroureter to a 4 mm proximal right ureteral stone. A punctate 1 
to 2 mm stone is seen at the lower pole of the left kidney. No other stones are 
identified. There is no left hydronephrosis or hydroureter or ureteral stone. STOMACH: Unremarkable. SMALL BOWEL: No dilatation or wall thickening. COLON: No dilatation or wall thickening. APPENDIX: Unremarkable. PERITONEUM: No ascites or pneumoperitoneum. RETROPERITONEUM: No lymphadenopathy or aortic aneurysm. REPRODUCTIVE ORGANS: Uterus and ovaries appear unremarkable. URINARY BLADDER: No mass or calculus. BONES: No destructive bone lesion. Loss of vertical disc height and signal 
residual disc bulging L5-S1 with bilateral neural foraminal narrowing, worse on 
the left. ADDITIONAL COMMENTS: N/A IMPRESSION: Obstructing 4 mm proximal right ureteral stone with delayed right 
nephrogram and mild hydronephrosis. Nonobstructing punctate left nephrolithiasis. L5-S1 degenerative disc disease. I saw the patient personally, took a history and did a complete physical exam at the bedside. I performed complex decision making in coming up with a diagnostic and treatment plan for the patient. I reviewed the patient's past medical records, current laboratory and radiology results, and actual Xray films/EKG. I have also discussed this case with the involved ED physician. Care Plan discussed with: 
  Patient, parents,  ED Doc Risk of deterioration:  High Total Time Coordinating Admission: 65    minutes   Total Critical Care Time:    
 
 
Amanda Velazquez MD

## 2019-03-02 NOTE — PROGRESS NOTES
Problem: Falls - Risk of 
Goal: *Absence of Falls Document Yoon Olson Fall Risk and appropriate interventions in the flowsheet. Outcome: Progressing Towards Goal 
Fall Risk Interventions: 
  
 
  
 
Medication Interventions: Patient to call before getting OOB, Assess postural VS orthostatic hypotension, Teach patient to arise slowly

## 2019-03-02 NOTE — ED NOTES
Called pharmacy to tell them 4mg/mL was given to pt instead of 4mg/mL, as that is what pyxis dispensed when over-rode (after having 4mg verified by pharmacy).

## 2019-03-02 NOTE — PROGRESS NOTES
Hospitalist Progress Note NAME: Uma Gomez :  1985 MRN:  [id number] Assessment / Plan: 
Sepsis due to UTI with obstructive right ureteral stone POA 
-Currently on aztreonam pending culture data 
-Afebrile but still with significant leukocytosis 
-Continue aggressive IV fluid resuscitation 
-Holding patient's HCTZ at present and explained rationale Asthma: asymptomatic, continue LABA/ICS 
 
PCOS and obesity: holding metformin due to CT with IV contrast. Continue OCP. 40 or above Morbid obesity / Body mass index is 40.74 kg/m². Code status: Full Prophylaxis: Lovenox Recommended Disposition: Home w/Family after signs/symptoms of sepsis resolve and culture data return and oral antibiotic regimen can be determined Subjective: Chief Complaint / Reason for Physician Visit Patient seen prior to urology intervention this morning. Complains of pain in the right flank, severe, associated with nausea and vomiting, unrelieved with Zofran. Denies dyspnea, diarrhea. States no hematuria and no dysuria. Discussed with RN events overnight. Review of Systems: 
Symptom Y/N Comments  Symptom Y/N Comments Fever/Chills    Chest Pain Poor Appetite y   Edema n   
Cough    Abdominal Pain Sputum    Joint Pain SOB/HALEY    Pruritis/Rash Nausea/vomit y   Tolerating PT/OT Diarrhea n   Tolerating Diet Constipation n   Other Could NOT obtain due to:   
 
Objective: VITALS:  
Last 24hrs VS reviewed since prior progress note. Most recent are: 
Patient Vitals for the past 24 hrs: 
 Temp Pulse Resp BP SpO2  
19 1124 98.3 °F (36.8 °C) 84 18 (!) 151/93 98 % 19 1123 97.9 °F (36.6 °C) 88 15  98 % 19 1115  87 21 (!) 152/93 97 % 19 1110  89 18 (!) 149/95 98 % 19 1105  88 15 (!) 149/96 96 % 19 1100  91 16 153/85 97 % 19 1059  91 15  96 % 19 1056 98.2 °F (36.8 °C) 92 16 147/90 98 % 03/02/19 1055    147/90   
03/02/19 0933 98.9 °F (37.2 °C) 91 13 139/85 97 % 03/02/19 0800 98.2 °F (36.8 °C) 82  (!) 145/93 98 % 03/02/19 0120 98.2 °F (36.8 °C) 93 18 120/81 100 % 03/01/19 2334 97.8 °F (36.6 °C) 96 20 107/61 100 % 03/01/19 1812 97.8 °F (36.6 °C) 98 20 118/71 100 % Intake/Output Summary (Last 24 hours) at 3/2/2019 1333 Last data filed at 3/2/2019 1045 Gross per 24 hour Intake 650 ml Output  Net 650 ml PHYSICAL EXAM: 
General: WD, WN. Alert, cooperative, appears in pain and writhing on bed EENT:  EOMI. Anicteric sclerae. Dry MM. Resp:  CTA bilaterally, no wheezing or rales. No accessory muscle use CV:  Regular  rhythm,  No edema GI:  Soft, Non distended, Non tender.  +Bowel sounds Neurologic:  Alert and oriented X 3, normal speech, Psych:   Good insight. Not anxious nor agitated Skin:  No rashes. No jaundice Reviewed most current lab test results and cultures  YES Reviewed most current radiology test results   YES Review and summation of old records today    NO Reviewed patient's current orders and MAR    YES 
PMH/ reviewed - no change compared to H&P 
________________________________________________________________________ Care Plan discussed with: 
  Comments Patient x Family RN x Care Manager Consultant Multidiciplinary team rounds were held today with , nursing, pharmacist and clinical coordinator. Patient's plan of care was discussed; medications were reviewed and discharge planning was addressed. ________________________________________________________________________ Total NON critical care TIME:  15  Minutes Total CRITICAL CARE TIME Spent:   Minutes non procedure based Comments >50% of visit spent in counseling and coordination of care    
________________________________________________________________________ Steven Leger MD  
 
 Procedures: see electronic medical records for all procedures/Xrays and details which were not copied into this note but were reviewed prior to creation of Plan. LABS: 
I reviewed today's most current labs and imaging studies. Pertinent labs include: 
Recent Labs 03/02/19 
0441 03/01/19 
1834 WBC 18.9* 17.5* HGB 13.1 14.6 HCT 38.6 42.2  368 Recent Labs 03/02/19 
0441 03/01/19 
1834  137  
K 3.6 3.5  106 CO2 19* 22 * 110* BUN 14 15 CREA 1.00 1.03* CA 8.1* 8.8 ALB 3.2* 3.8 TBILI 0.4 0.4 SGOT 15 15 ALT 22 28 Signed: Marie Martinez MD

## 2019-03-02 NOTE — ADDENDUM NOTE
Addendum  created 03/02/19 1154 by Verona Wilder CRNA    Flowsheet data copied forward, Intraprocedure Flowsheets edited

## 2019-03-02 NOTE — ANESTHESIA POSTPROCEDURE EVALUATION
Procedure(s):  RIGHT RETROGRADE CYSTOSCOPY URETERAL STENT INSERTION. Anesthesia Post Evaluation        Patient location during evaluation: PACU  Note status: Adequate. Level of consciousness: responsive to verbal stimuli and sleepy but conscious  Pain management: satisfactory to patient  Airway patency: patent  Anesthetic complications: no  Cardiovascular status: acceptable  Respiratory status: acceptable  Hydration status: acceptable  Comments: +Post-Anesthesia Evaluation and Assessment    Patient: Tristin Estrada MRN: [id number]  SSN: [id number]   YOB: 1985  Age: 34 y.o. Sex: female      Cardiovascular Function/Vital Signs    BP (!) 152/93   Pulse 87   Temp 36.8 °C (98.2 °F)   Resp 21   Ht 5' 3\" (1.6 m)   Wt 104.3 kg (230 lb)   SpO2 97%   BMI 40.74 kg/m²     Patient is status post Procedure(s):  RIGHT RETROGRADE CYSTOSCOPY URETERAL STENT INSERTION. Nausea/Vomiting: Controlled. Postoperative hydration reviewed and adequate. Pain:  Pain Scale 1: Numeric (0 - 10) (03/02/19 1056)  Pain Intensity 1: 0(no pain urge to void) (03/02/19 1056)   Managed. Neurological Status:   Neuro (WDL): Within Defined Limits (03/02/19 0933)   At baseline. Mental Status and Level of Consciousness: Arousable. Pulmonary Status:   O2 Device: Room air (03/02/19 1105)   Adequate oxygenation and airway patent. Complications related to anesthesia: None    Post-anesthesia assessment completed. No concerns.     Signed By: Chris Espinal MD    3/2/2019  Post anesthesia nausea and vomiting:  controlled      Visit Vitals  BP (!) 152/93   Pulse 87   Temp 36.8 °C (98.2 °F)   Resp 21   Ht 5' 3\" (1.6 m)   Wt 104.3 kg (230 lb)   SpO2 97%   BMI 40.74 kg/m²

## 2019-03-02 NOTE — ED NOTES
TRANSFER - OUT REPORT: 
 
Verbal report given to Merged with [facility] RN(name) on Stephani Block  being transferred to Oncology (unit) for routine progression of care Report consisted of patients Situation, Background, Assessment and  
Recommendations(SBAR). Information from the following report(s) SBAR, ED Summary, Procedure Summary, MAR and Recent Results was reviewed with the receiving nurse. Lines:  
Peripheral IV 03/01/19 Left Forearm (Active) Site Assessment Clean, dry, & intact 3/1/2019  8:07 PM  
Phlebitis Assessment 0 3/1/2019  8:07 PM  
Infiltration Assessment 0 3/1/2019  8:07 PM  
Dressing Status Clean, dry, & intact 3/1/2019  8:07 PM  
Dressing Type Transparent 3/1/2019  8:07 PM  
Hub Color/Line Status Pink 3/1/2019  8:07 PM  
  
 
Opportunity for questions and clarification was provided. Patient transported with: 
 [employer]

## 2019-03-02 NOTE — PERIOP NOTES
TRANSFER - OUT REPORT: 
 
Verbal report given to [address] [place] [address] on [facility]  being transferred to [facility](unit) for routine post - op Report consisted of patients Situation, Background, Assessment and  
Recommendations(SBAR). Information from the following report(s) SBAR, OR Summary, Procedure Summary, Intake/Output and MAR was reviewed with the receiving nurse. Opportunity for questions and clarification was provided. Patient transported with: 
 Registered Nurse

## 2019-03-02 NOTE — OP NOTES
[address]  OPERATIVE REPORT    Name:  Verna Eller  MR#:  [id number]  :  1985  ACCOUNT #:  [de-identified]  DATE OF SERVICE:  2019      PREOPERATIVE DIAGNOSIS:  Right pyonephrosis. POSTOPERATIVE DIAGNOSIS:  Right pyonephrosis. PROCEDURE PERFORMED:  Cystoscopy, placement right ureteral stent. SURGEON:  Charles Kingston MD    ASSISTANT:  None    ANESTHESIA:  General.    COMPLICATIONS:  None. SPECIMENS REMOVED:  None. DRAINS:  A 6-St Helenian x 24 cm double-J stent. IMPLANTS:  _____. ESTIMATED BLOOD LOSS:  None. HISTORY:  This is a 37-year-old  female, who has not had prior stone disease. She really began feeling much worse yesterday and developed right flank pain with fevers and chills, elevated white blood count. She was admitted to the hospital, given parenteral antibiotics. My partner has allowed me to place a stent for drainage of her urinary system this morning. PROCEDURE:  She was brought to the operating room. After proper timeout, she was carefully prepped and draped in the dorsal lithotomy position. She has been on parenteral antibiotics. I passed a 21-St Helenian cystoscope into her bladder. Both ureteral orifices are slit-like. There are no mucosal abnormalities to the bladder. I cannulated the right orifice with a 5-St Helenian TigerTail catheter and under fluoroscopic guidance passed it up to the kidney. A hydronephrotic drip immediately comes out. Through the 5-St Helenian catheter, I placed a AudioTag[employer] Guidewire and over top of the Guidewire after removing the 5-St Helenian stent, I placed a 6-St Helenian x 24 cm double-J stent. There was a good pigtail both within the renal pelvis as well as in the bladder. There was plenty of fluid seen effluxing through the side holes. The bladder was drained. She was filled with Xylocaine jelly. She tolerated the procedure quite well and was returned to recovery room in stable condition.         Dulce Maria Renner Charu Reyes MD      [id number]  D:  03/02/2019 10:52  T:  03/02/2019 17:33  JOB #:  [id number]  CC:  Connie Dumont MD

## 2019-03-02 NOTE — ED PROVIDER NOTES
EMERGENCY DEPARTMENT HISTORY AND PHYSICAL EXAM 
 
 
Date: 3/1/2019 Patient Name: Kadi Barajas History of Presenting Illness Chief Complaint Patient presents with  Flank Pain  
  right flank pain x today with intermittent episodes of sharp pains History Provided By: Patient and EMS 
 
HPI: Kadi Barajas, 34 y.o. female with PMHx significant for asthma, HTN, PCOS, presents by EMS to the ED with cc of sudden-onset, acute, moderate right flank pain that started at 2:50 PM. Pt reports nausea as well. Pt states the pain is sharp/stabbing with radiation from the right-sided back down to the right-sided suprapubic region. Pt reports diaphoresis and chills as well. She states she has no Hx of kidney stones and no FHx of kidney stones. She states she has had no abd surgeries in the past. Pt notes her LNMP was 3 weeks ago. EMS gave fentanyl with minimal relief. She notes she is on BCPs. Pt states she has a Hx of UTIs, and notes she is sometimes asymptomatic when she gets UTIs. Pt states she has taken no additional meds to try and relieve her Sx. She denies any modifying factors to her Sx. Pt specifically denies fever, vomiting, diarrhea, hematemesis, hematuria, dysuria, melena, hematochezia, constipation, and dizziness. There are no other complaints, changes, or physical findings at this time. Social History: -tobacco (former smoker), +EtOH (socially), -Illicit Drugs Allergies: Ceclor PCP: Unknown, Provider Current Facility-Administered Medications Medication Dose Route Frequency Provider Last Rate Last Dose  morphine 4 mg/mL injection  0.9% sodium chloride infusion  150 mL/hr IntraVENous CONTINUOUS Janeth Simeon MD      
 
Current Outpatient Medications Medication Sig Dispense Refill  hydrocodone-acetaminophen (LORTAB) 5-500 mg per tablet Take 1 Tab by mouth every four (4) hours as needed for Pain.  10 Tab 0  
  fluticasone-salmeterol (ADVAIR DISKUS) 100-50 mcg/dose diskus inhaler Take 1 Puff by inhalation every twelve (12) hours.  fexofenadine (ALLEGRA) 180 mg tablet Take 180 mg by mouth daily. Past History Past Medical History: 
Past Medical History:  
Diagnosis Date  Asthma Past Surgical History: No past surgical history on file. Family History: No family history on file. Social History: 
Social History Tobacco Use  Smoking status: Former Smoker Last attempt to quit: 10/1/2010 Years since quittin.4 Substance Use Topics  Alcohol use: Yes Comment: socially \"twice a year\"  Drug use: No  
 
 
Allergies: Allergies Allergen Reactions  Ceclor [Cefaclor] Unknown (comments) Review of Systems Review of Systems Constitutional: Positive for chills and diaphoresis. Negative for activity change and fever. HENT: Negative for congestion and sore throat. Eyes: Negative for pain and redness. Respiratory: Negative for cough, chest tightness and shortness of breath. Cardiovascular: Negative for chest pain and palpitations. Gastrointestinal: Positive for abdominal pain (right, radiating from back) and nausea. Negative for blood in stool, constipation, diarrhea and vomiting. Denies melena, denies hematochezia, denies hematemesis Genitourinary: Positive for flank pain (right). Negative for dysuria, frequency, hematuria and urgency. Musculoskeletal: Positive for back pain (right). Negative for neck pain. Skin: Negative for rash. Neurological: Negative for dizziness, syncope, weakness, light-headedness, numbness and headaches. Psychiatric/Behavioral: Negative for confusion. All other systems reviewed and are negative. Physical Exam  
Physical Exam  
Constitutional: She is oriented to person, place, and time. She appears well-developed and well-nourished. No distress. HENT:  
Head: Normocephalic.   
Nose: Nose normal.  
 Mouth/Throat: Oropharynx is clear and moist. No oropharyngeal exudate. Eyes: Conjunctivae are normal. Pupils are equal, round, and reactive to light. No scleral icterus. Neck: Normal range of motion. Neck supple. No JVD present. No tracheal deviation present. No thyromegaly present. Cardiovascular: Normal rate, regular rhythm and intact distal pulses. Exam reveals no gallop and no friction rub. No murmur heard. Pulmonary/Chest: Effort normal and breath sounds normal. No stridor. No respiratory distress. She has no wheezes. She has no rales. Abdominal: Soft. Bowel sounds are normal. She exhibits no distension. There is tenderness (right flank, mild RLQ). There is no rebound and no guarding. Musculoskeletal: Normal range of motion. She exhibits no edema. Lymphadenopathy:  
  She has no cervical adenopathy. Neurological: She is alert and oriented to person, place, and time. No cranial nerve deficit. She exhibits normal muscle tone. Coordination normal.  
Skin: Skin is warm and dry. No rash noted. She is not diaphoretic. No erythema. Psychiatric: She has a normal mood and affect. Her behavior is normal.  
Nursing note and vitals reviewed. Diagnostic Study Results Labs - Recent Results (from the past 12 hour(s)) CBC WITH AUTOMATED DIFF Collection Time: 03/01/19  6:34 PM  
Result Value Ref Range WBC 17.5 (H) 3.6 - 11.0 K/uL  
 RBC 4.90 3.80 - 5.20 M/uL  
 HGB 14.6 11.5 - 16.0 g/dL HCT 42.2 35.0 - 47.0 % MCV 86.1 80.0 - 99.0 FL  
 MCH 29.8 26.0 - 34.0 PG  
 MCHC 34.6 30.0 - 36.5 g/dL  
 RDW 13.4 11.5 - 14.5 % PLATELET 702 686 - 176 K/uL MPV 11.8 8.9 - 12.9 FL  
 NRBC 0.0 0  WBC ABSOLUTE NRBC 0.00 0.00 - 0.01 K/uL NEUTROPHILS 80 (H) 32 - 75 % LYMPHOCYTES 14 12 - 49 % MONOCYTES 5 5 - 13 % EOSINOPHILS 1 0 - 7 % BASOPHILS 0 0 - 1 % IMMATURE GRANULOCYTES 0 0.0 - 0.5 % ABS. NEUTROPHILS 13.9 (H) 1.8 - 8.0 K/UL ABS. LYMPHOCYTES 2.4 0.8 - 3.5 K/UL  
 ABS. MONOCYTES 0.9 0.0 - 1.0 K/UL  
 ABS. EOSINOPHILS 0.1 0.0 - 0.4 K/UL  
 ABS. BASOPHILS 0.1 0.0 - 0.1 K/UL  
 ABS. IMM. GRANS. 0.1 (H) 0.00 - 0.04 K/UL  
 DF AUTOMATED METABOLIC PANEL, COMPREHENSIVE Collection Time: 03/01/19  6:34 PM  
Result Value Ref Range Sodium 137 136 - 145 mmol/L Potassium 3.5 3.5 - 5.1 mmol/L Chloride 106 97 - 108 mmol/L  
 CO2 22 21 - 32 mmol/L Anion gap 9 5 - 15 mmol/L Glucose 110 (H) 65 - 100 mg/dL BUN 15 6 - 20 MG/DL Creatinine 1.03 (H) 0.55 - 1.02 MG/DL  
 BUN/Creatinine ratio 15 12 - 20 GFR est AA >60 >60 ml/min/1.73m2 GFR est non-AA >60 >60 ml/min/1.73m2 Calcium 8.8 8.5 - 10.1 MG/DL Bilirubin, total 0.4 0.2 - 1.0 MG/DL  
 ALT (SGPT) 28 12 - 78 U/L  
 AST (SGOT) 15 15 - 37 U/L Alk. phosphatase 52 45 - 117 U/L Protein, total 7.8 6.4 - 8.2 g/dL Albumin 3.8 3.5 - 5.0 g/dL Globulin 4.0 2.0 - 4.0 g/dL A-G Ratio 1.0 (L) 1.1 - 2.2 URINALYSIS W/ REFLEX CULTURE Collection Time: 03/01/19  7:47 PM  
Result Value Ref Range Color DARK YELLOW Appearance CLOUDY (A) CLEAR Specific gravity 1.027 1.003 - 1.030    
 pH (UA) 6.0 5.0 - 8.0 Protein 30 (A) NEG mg/dL Glucose NEGATIVE  NEG mg/dL Ketone >80 (A) NEG mg/dL Bilirubin NEGATIVE  NEG Blood LARGE (A) NEG Urobilinogen 0.2 0.2 - 1.0 EU/dL Nitrites NEGATIVE  NEG Leukocyte Esterase MODERATE (A) NEG    
 WBC 20-50 0 - 4 /hpf  
 RBC >100 (H) 0 - 5 /hpf Epithelial cells MODERATE (A) FEW /lpf Bacteria 2+ (A) NEG /hpf  
 UA:UC IF INDICATED URINE CULTURE ORDERED (A) CNI Mucus 1+ (A) NEG /lpf  
HCG URINE, QL Collection Time: 03/01/19  7:47 PM  
Result Value Ref Range HCG urine, QL NEGATIVE  NEG    
LACTIC ACID Collection Time: 03/01/19  8:41 PM  
Result Value Ref Range Lactic acid 1.0 0.4 - 2.0 MMOL/L Radiologic Studies -  
CT ABD PELV W CONT Final Result IMPRESSION: Obstructing 4 mm proximal right ureteral stone with delayed right  
nephrogram and mild hydronephrosis. Nonobstructing punctate left  
nephrolithiasis. L5-S1 degenerative disc disease. CT Results  (Last 48 hours) 03/01/19 2218  CT ABD PELV W CONT Final result Impression:  IMPRESSION: Obstructing 4 mm proximal right ureteral stone with delayed right  
nephrogram and mild hydronephrosis. Nonobstructing punctate left  
nephrolithiasis. L5-S1 degenerative disc disease. Narrative:  EXAM: CT ABD PELV W CONT INDICATION: Right lower quadrant abdominal pain. COMPARISON: None. CONTRAST: 100 mL of Isovue-370. TECHNIQUE:   
Following the uneventful intravenous administration of contrast, thin axial  
images were obtained through the abdomen and pelvis. Coronal and sagittal  
reconstructions were generated. Oral contrast was not administered. CT dose  
reduction was achieved through use of a standardized protocol tailored for this  
examination and automatic exposure control for dose modulation. FINDINGS:   
LUNG BASES: Clear. INCIDENTALLY IMAGED HEART AND MEDIASTINUM: Unremarkable. LIVER: No mass or biliary dilatation. GALLBLADDER: Unremarkable. SPLEEN: No mass. PANCREAS: No mass or ductal dilatation. ADRENALS: Unremarkable. KIDNEYS: There is delayed nephrogram on the right with mild right hydronephrosis  
and proximal hydroureter to a 4 mm proximal right ureteral stone. A punctate 1  
to 2 mm stone is seen at the lower pole of the left kidney. No other stones are  
identified. There is no left hydronephrosis or hydroureter or ureteral stone. STOMACH: Unremarkable. SMALL BOWEL: No dilatation or wall thickening. COLON: No dilatation or wall thickening. APPENDIX: Unremarkable. PERITONEUM: No ascites or pneumoperitoneum. RETROPERITONEUM: No lymphadenopathy or aortic aneurysm. REPRODUCTIVE ORGANS: Uterus and ovaries appear unremarkable. URINARY BLADDER: No mass or calculus. BONES: No destructive bone lesion. Loss of vertical disc height and signal  
residual disc bulging L5-S1 with bilateral neural foraminal narrowing, worse on  
the left. ADDITIONAL COMMENTS: N/A  
   
  
  
 
CXR Results  (Last 48 hours) None Medical Decision Making I am the first provider for this patient. I reviewed the vital signs, available nursing notes, past medical history, past surgical history, family history and social history. Vital Signs-Reviewed the patient's vital signs. Patient Vitals for the past 12 hrs: 
 Temp Pulse Resp BP SpO2  
03/01/19 1812 97.8 °F (36.6 °C) 98 20 118/71 100 % Pulse Oximetry Analysis - 100% on RA Cardiac Monitor:  
Rate: 98 bpm 
Rhythm: Normal Sinus Rhythm Records Reviewed: Nursing Notes, Old Medical Records, Previous Radiology Studies and Previous Laboratory Studies Provider Notes (Medical Decision Making): DDx: kidney stone, UTI, pyelonephritis, low suspicion appendicitis ED Course:  
Initial assessment performed. The patients presenting problems have been discussed, and they are in agreement with the care plan formulated and outlined with them. I have encouraged them to ask questions as they arise throughout their visit. Meds given in the ER:  
Medications  
morphine 4 mg/mL injection (4 mg  Canceled Entry 3/1/19 2132)  
0.9% sodium chloride infusion (not administered)  
ketorolac (TORADOL) injection 30 mg (30 mg IntraVENous Given 3/1/19 1942) morphine injection 4 mg (4 mg IntraVENous Given 3/1/19 2001)  
ondansetron (ZOFRAN) injection 4 mg (4 mg IntraVENous Given 3/1/19 2000)  
sodium chloride 0.9 % bolus infusion 1,000 mL (0 mL IntraVENous IV Completed 3/1/19 2138) iopamidol (ISOVUE-370) 76 % injection 100 mL (100 mL IntraVENous Given 3/1/19 2219) sodium chloride (NS) flush 10 mL (10 mL IntraVENous Given 3/1/19 2219) levoFLOXacin (LEVAQUIN) 750 mg in D5W IVPB (750 mg IntraVENous New Bag 3/1/19 2110) morphine injection 4 mg (4 mg IntraVENous Given 3/1/19 2127) CONSULT NOTE:  
10:55 PM 
Lauren Casiano MD spoke with Erin Sol MD  
Specialty: urology Discussed pt's hx, disposition, and available diagnostic and imaging results. Reviewed care plans. Consultant agrees with plans as outlined. Dr. Tyesha Chavez recommends IVF and IV abx. He states to admit to the hospitalist and he will consult tomorrow. Written by SERA Sheth, as dictated by Lauren Casiano MD. 
 
CONSULT NOTE:  
11:08 PM 
Lauren Casiano MD spoke with Mary Cates MD  
Specialty: Hospitalist 
Discussed pt's hx, disposition, and available diagnostic and imaging results. Reviewed care plans. Consultant will evaluate pt for admission. Written by SERA Sheth, as dictated by Lauren Casiano MD. Critical Care Time:  
None Disposition: 
ADMIT NOTE: 
11:08 PM 
The patient is being admitted to the hospital by Mary Cates MD.  The results of their tests and reasons for their admission have been discussed with the patient and/or available family. They convey agreement and understanding for the need to be admitted and for their admission diagnosis. PLAN: 
1. Admit to hospitalist  
 
Diagnosis Clinical Impression: 1. Pyelonephritis, acute 2. Ureteral calculus, right Attestations: This note is prepared by Nito Starr. Brenda Navarro, acting as Scribe for Lauren Casiano MD. Lauren Casiano MD: The scribe's documentation has been prepared under my direction and personally reviewed by me in its entirety. I confirm that the note above accurately reflects all work, treatment, procedures, and medical decision making performed by me. This note will not be viewable in [address]

## 2019-03-03 LAB
ALBUMIN SERPL-MCNC: 2.9 G/DL (ref 3.5–5)
ALBUMIN/GLOB SERPL: 0.9 {RATIO} (ref 1.1–2.2)
ALP SERPL-CCNC: 38 U/L (ref 45–117)
ALT SERPL-CCNC: 20 U/L (ref 12–78)
ANION GAP SERPL CALC-SCNC: 9 MMOL/L (ref 5–15)
APPEARANCE UR: CLEAR
AST SERPL-CCNC: 10 U/L (ref 15–37)
BACTERIA SPEC CULT: NORMAL
BACTERIA URNS QL MICRO: NEGATIVE /HPF
BILIRUB SERPL-MCNC: 0.3 MG/DL (ref 0.2–1)
BILIRUB UR QL: NEGATIVE
BUN SERPL-MCNC: 7 MG/DL (ref 6–20)
BUN/CREAT SERPL: 10 (ref 12–20)
CALCIUM SERPL-MCNC: 8.2 MG/DL (ref 8.5–10.1)
CC UR VC: NORMAL
CHLORIDE SERPL-SCNC: 111 MMOL/L (ref 97–108)
CO2 SERPL-SCNC: 23 MMOL/L (ref 21–32)
COLOR UR: ABNORMAL
CREAT SERPL-MCNC: 0.72 MG/DL (ref 0.55–1.02)
EPITH CASTS URNS QL MICRO: ABNORMAL /LPF
GLOBULIN SER CALC-MCNC: 3.3 G/DL (ref 2–4)
GLUCOSE SERPL-MCNC: 83 MG/DL (ref 65–100)
GLUCOSE UR STRIP.AUTO-MCNC: NEGATIVE MG/DL
HGB UR QL STRIP: ABNORMAL
HYALINE CASTS URNS QL MICRO: ABNORMAL /LPF (ref 0–5)
KETONES UR QL STRIP.AUTO: NEGATIVE MG/DL
LEUKOCYTE ESTERASE UR QL STRIP.AUTO: ABNORMAL
NITRITE UR QL STRIP.AUTO: NEGATIVE
PH UR STRIP: 6.5 [PH] (ref 5–8)
POTASSIUM SERPL-SCNC: 3.4 MMOL/L (ref 3.5–5.1)
PROT SERPL-MCNC: 6.2 G/DL (ref 6.4–8.2)
PROT UR STRIP-MCNC: 30 MG/DL
RBC #/AREA URNS HPF: >100 /HPF (ref 0–5)
SERVICE CMNT-IMP: NORMAL
SODIUM SERPL-SCNC: 143 MMOL/L (ref 136–145)
SP GR UR REFRACTOMETRY: 1.01 (ref 1–1.03)
UA: UC IF INDICATED,UAUC: ABNORMAL
UROBILINOGEN UR QL STRIP.AUTO: 0.2 EU/DL (ref 0.2–1)
WBC URNS QL MICRO: ABNORMAL /HPF (ref 0–4)

## 2019-03-03 PROCEDURE — 74011250637 HC RX REV CODE- 250/637: Performed by: INTERNAL MEDICINE

## 2019-03-03 PROCEDURE — 65270000015 HC RM PRIVATE ONCOLOGY

## 2019-03-03 PROCEDURE — 74011250636 HC RX REV CODE- 250/636: Performed by: INTERNAL MEDICINE

## 2019-03-03 PROCEDURE — 94760 N-INVAS EAR/PLS OXIMETRY 1: CPT

## 2019-03-03 PROCEDURE — 36415 COLL VENOUS BLD VENIPUNCTURE: CPT

## 2019-03-03 PROCEDURE — 74011250636 HC RX REV CODE- 250/636: Performed by: EMERGENCY MEDICINE

## 2019-03-03 PROCEDURE — 87086 URINE CULTURE/COLONY COUNT: CPT

## 2019-03-03 PROCEDURE — 81001 URINALYSIS AUTO W/SCOPE: CPT

## 2019-03-03 PROCEDURE — 74011000258 HC RX REV CODE- 258: Performed by: INTERNAL MEDICINE

## 2019-03-03 PROCEDURE — 80053 COMPREHEN METABOLIC PANEL: CPT

## 2019-03-03 RX ORDER — HYDRALAZINE HYDROCHLORIDE 20 MG/ML
10 INJECTION INTRAMUSCULAR; INTRAVENOUS
Status: DISCONTINUED | OUTPATIENT
Start: 2019-03-03 | End: 2019-03-04 | Stop reason: HOSPADM

## 2019-03-03 RX ORDER — ENOXAPARIN SODIUM 100 MG/ML
40 INJECTION SUBCUTANEOUS EVERY 12 HOURS
Status: DISCONTINUED | OUTPATIENT
Start: 2019-03-03 | End: 2019-03-04 | Stop reason: HOSPADM

## 2019-03-03 RX ADMIN — HYDROMORPHONE HYDROCHLORIDE 0.5 MG: 1 INJECTION, SOLUTION INTRAMUSCULAR; INTRAVENOUS; SUBCUTANEOUS at 20:48

## 2019-03-03 RX ADMIN — ACETAMINOPHEN 650 MG: 325 TABLET ORAL at 05:46

## 2019-03-03 RX ADMIN — HYDROMORPHONE HYDROCHLORIDE 0.5 MG: 1 INJECTION, SOLUTION INTRAMUSCULAR; INTRAVENOUS; SUBCUTANEOUS at 14:30

## 2019-03-03 RX ADMIN — ENOXAPARIN SODIUM 40 MG: 40 INJECTION SUBCUTANEOUS at 20:48

## 2019-03-03 RX ADMIN — AMLODIPINE BESYLATE 5 MG: 5 TABLET ORAL at 08:39

## 2019-03-03 RX ADMIN — AZTREONAM 1 G: 1 INJECTION, POWDER, LYOPHILIZED, FOR SOLUTION INTRAMUSCULAR; INTRAVENOUS at 06:28

## 2019-03-03 RX ADMIN — FLUTICASONE FUROATE AND VILANTEROL TRIFENATATE 1 PUFF: 100; 25 POWDER RESPIRATORY (INHALATION) at 08:39

## 2019-03-03 RX ADMIN — AZTREONAM 1 G: 1 INJECTION, POWDER, LYOPHILIZED, FOR SOLUTION INTRAMUSCULAR; INTRAVENOUS at 22:21

## 2019-03-03 RX ADMIN — AZTREONAM 1 G: 1 INJECTION, POWDER, LYOPHILIZED, FOR SOLUTION INTRAMUSCULAR; INTRAVENOUS at 01:16

## 2019-03-03 RX ADMIN — SODIUM CHLORIDE 150 ML/HR: 900 INJECTION, SOLUTION INTRAVENOUS at 05:45

## 2019-03-03 RX ADMIN — Medication 10 ML: at 22:18

## 2019-03-03 RX ADMIN — AZTREONAM 1 G: 1 INJECTION, POWDER, LYOPHILIZED, FOR SOLUTION INTRAMUSCULAR; INTRAVENOUS at 16:32

## 2019-03-03 NOTE — PROGRESS NOTES
Oncology End of Shift Note Bedside shift change report given to Marilin Morataya RN (incoming nurse) by Milagro Baxter (outgoing nurse) on [facility]. Report included the following information SBAR, Kardex, Intake/Output and MAR. Shift Summary: did not sleep much last night, up to void about 4 times last night, patient reported urine to now a \"yellow\" when it was pink in the beginning of the shift, AM labs drawn, needed new IV, did not want any pain medication except tylenol, Temp was 100.0 at 3am (received tylenol) Issues for Physician to Address:  
 
Patient on Cardiac Monitoring? [x] Yes 
[] No 
 
Rhythm: NSR/Sinus Tach Shift Events Milagro Baxter

## 2019-03-03 NOTE — PROGRESS NOTES
Oncology End of Shift Note Bedside shift change report given to Artur Clark (incoming nurse) by Lyla Manriquez (outgoing nurse) on [facility]. Report included the following information SBAR, Kardex, Procedure Summary and MAR. Shift Summary: 1 Bm, pain management, d/c IV fluids, probably discharge tomorrow, tele d/c Issues for Physician to Address:  none Patient on Cardiac Monitoring? [] Yes 
[x] No 
 
Rhythm:   
 
 
 
Shift Events Lyla Manriquez

## 2019-03-03 NOTE — PROGRESS NOTES
Hospitalist Progress Note NAME: Kadi Barajas :  1985 MRN:  [id number] Assessment / Plan: 
Sepsis due to UTI with obstructive right ureteral stone POA 
-Currently on aztreonam -- culture at time of presentation showing only mixed urogenital tim, but patient had presented with sepsis, so I suspect that this is a false negative. New urine culture to be obtained but there is a good likelihood that it will be sterile. 
-Low grade temperature elevation overnight. In light of that, I would like to hold on discharge and monitor overnight. 
-Discontinue IVF today 
-Holding patient's HCTZ at present and explained rationale Asthma: asymptomatic, continue LABA/ICS 
 
PCOS and obesity: holding metformin due to CT with IV contrast. Continue OCP. 40 or above Morbid obesity / Body mass index is 40.74 kg/m². Code status: Full Prophylaxis: Lovenox Recommended Disposition: Home w/Family likely tomorrow provided there is no further fever or signs/symptoms of sepsis Subjective: Chief Complaint / Reason for Physician Visit Feeling much better since ureteral stent placement, with resolution of pain, nausea, and vomiting. Had low-grade temperature elevation overnight. Urine with mixed urogenital tim in culture. Patient reports that she is not frequently on antibiotics, gets UTI maybe once a year. Discussed with RN events overnight. Review of Systems: 
Symptom Y/N Comments  Symptom Y/N Comments Fever/Chills    Chest Pain Poor Appetite n   Edema n   
Cough    Abdominal Pain Sputum    Joint Pain SOB/HALEY n   Pruritis/Rash Nausea/vomit n   Tolerating PT/OT Diarrhea n   Tolerating Diet y Constipation n   Other Could NOT obtain due to:   
 
Objective: VITALS:  
Last 24hrs VS reviewed since prior progress note. Most recent are: 
Patient Vitals for the past 24 hrs: 
 Temp Pulse Resp BP SpO2  
19 1255 98.3 °F (36.8 °C)     03/03/19 0815 98.4 °F (36.9 °C) 88 18 (!) 143/99 98 % 03/03/19 0226 100 °F (37.8 °C) 89 18 127/74 98 % 03/02/19 2347 99.1 °F (37.3 °C) 94 17 (!) 146/96 98 % 03/02/19 2038 99.5 °F (37.5 °C) 99 18 (!) 145/92 99 % 03/02/19 1500 98.9 °F (37.2 °C) (!) 101 18 139/75 97 % No intake or output data in the 24 hours ending 03/03/19 1356 PHYSICAL EXAM: 
General: WD, WN. Alert, cooperative, appears comfortable today and nontoxic. EENT:  EOMI. Anicteric sclerae. Dry MM. Resp:  CTA bilaterally, no wheezing or rales. No accessory muscle use CV:  Regular  rhythm,  No edema GI:  Soft, Non distended, Non tender.  +Bowel sounds Neurologic:  Alert and oriented X 3, normal speech, Psych:   Good insight. Not anxious nor agitated Skin:  No rashes. No jaundice Reviewed most current lab test results and cultures  YES Reviewed most current radiology test results   YES Review and summation of old records today    NO Reviewed patient's current orders and MAR    YES 
PMH/SH reviewed - no change compared to H&P 
________________________________________________________________________ Care Plan discussed with: 
  Comments Patient x Family RN x Care Manager Consultant Multidiciplinary team rounds were held today with , nursing, pharmacist and clinical coordinator. Patient's plan of care was discussed; medications were reviewed and discharge planning was addressed. ________________________________________________________________________ Total NON critical care TIME:  15  Minutes Total CRITICAL CARE TIME Spent:   Minutes non procedure based Comments >50% of visit spent in counseling and coordination of care    
________________________________________________________________________ Donal Litten, MD  
 
Procedures: see electronic medical records for all procedures/Xrays and details which were not copied into this note but were reviewed prior to creation of Plan. LABS: 
I reviewed today's most current labs and imaging studies. Pertinent labs include: 
Recent Labs 03/02/19 
0441 03/01/19 
1834 WBC 18.9* 17.5* HGB 13.1 14.6 HCT 38.6 42.2  368 Recent Labs 03/03/19 
0244 03/02/19 
0441 03/01/19 
1834  139 137  
K 3.4* 3.6 3.5 * 108 106 CO2 23 19* 22 GLU 83 104* 110* BUN 7 14 15 CREA 0.72 1.00 1.03* CA 8.2* 8.1* 8.8 ALB 2.9* 3.2* 3.8 TBILI 0.3 0.4 0.4 SGOT 10* 15 15 ALT 20 22 28 Signed: Chang Pineda MD

## 2019-03-03 NOTE — PROGRESS NOTES
Changed lovenox to 40mg subcutaneously every 12h based on BMI > 40 per protocol.   
 
ELINA MontemayorBANKS

## 2019-03-03 NOTE — PROGRESS NOTES
Problem: Falls - Risk of 
Goal: *Absence of Falls Document Aftab Marc Fall Risk and appropriate interventions in the flowsheet. Outcome: Progressing Towards Goal 
Fall Risk Interventions: 
  
 
  
 
Medication Interventions: Teach patient to arise slowly

## 2019-03-03 NOTE — PROGRESS NOTES
Oncology End of Shift Note Bedside shift change report given to Milady Aviles RN (incoming nurse) by Maria Elena Louis (outgoing nurse) on [facility]. Report included the following information SBAR, Kardex and MAR. Shift Summary: Pt was given dilaudid twice. Pt went to OR for right retrograde cystoscopy with a stent. Pt receiving fluids at 150 mL/hr. Pt receives dilaudid q3 and zofran q4 or as needed. Pt had propofol and fentanyl when she went to OR for operation. Pt is now on regular diet. Issues for Physician to Address:  None. Patient on Cardiac Monitoring? [x] Yes 
[] No 
 
Rhythm: Telemetry: normal sinus rhythm Shift Events Maria Elena Louis

## 2019-03-03 NOTE — PROGRESS NOTES
Tolerating stent expectedly well. Afebrile, do not see repeat WBC. From our standpoint, she can go home on PO antibiotics and followup with Dr aMmie Burger later this week at [phone]. Please call back with any questions.

## 2019-03-04 VITALS
WEIGHT: 230 LBS | HEART RATE: 95 BPM | TEMPERATURE: 98.6 F | BODY MASS INDEX: 40.75 KG/M2 | HEIGHT: 63 IN | SYSTOLIC BLOOD PRESSURE: 153 MMHG | RESPIRATION RATE: 16 BRPM | DIASTOLIC BLOOD PRESSURE: 98 MMHG | OXYGEN SATURATION: 99 %

## 2019-03-04 LAB
BASOPHILS # BLD: 0.1 K/UL (ref 0–0.1)
BASOPHILS NFR BLD: 1 % (ref 0–1)
DIFFERENTIAL METHOD BLD: ABNORMAL
EOSINOPHIL # BLD: 0.6 K/UL (ref 0–0.4)
EOSINOPHIL NFR BLD: 4 % (ref 0–7)
ERYTHROCYTE [DISTWIDTH] IN BLOOD BY AUTOMATED COUNT: 13.4 % (ref 11.5–14.5)
HCT VFR BLD AUTO: 36.7 % (ref 35–47)
HGB BLD-MCNC: 12.6 G/DL (ref 11.5–16)
IMM GRANULOCYTES # BLD AUTO: 0 K/UL (ref 0–0.04)
IMM GRANULOCYTES NFR BLD AUTO: 0 % (ref 0–0.5)
LYMPHOCYTES # BLD: 4.3 K/UL (ref 0.8–3.5)
LYMPHOCYTES NFR BLD: 35 % (ref 12–49)
MCH RBC QN AUTO: 29.9 PG (ref 26–34)
MCHC RBC AUTO-ENTMCNC: 34.3 G/DL (ref 30–36.5)
MCV RBC AUTO: 87.2 FL (ref 80–99)
MONOCYTES # BLD: 0.8 K/UL (ref 0–1)
MONOCYTES NFR BLD: 7 % (ref 5–13)
NEUTS SEG # BLD: 6.7 K/UL (ref 1.8–8)
NEUTS SEG NFR BLD: 54 % (ref 32–75)
NRBC # BLD: 0 K/UL (ref 0–0.01)
NRBC BLD-RTO: 0 PER 100 WBC
PLATELET # BLD AUTO: 287 K/UL (ref 150–400)
PMV BLD AUTO: 11.5 FL (ref 8.9–12.9)
RBC # BLD AUTO: 4.21 M/UL (ref 3.8–5.2)
WBC # BLD AUTO: 12.4 K/UL (ref 3.6–11)

## 2019-03-04 PROCEDURE — 94760 N-INVAS EAR/PLS OXIMETRY 1: CPT

## 2019-03-04 PROCEDURE — 85025 COMPLETE CBC W/AUTO DIFF WBC: CPT

## 2019-03-04 PROCEDURE — 74011250637 HC RX REV CODE- 250/637: Performed by: INTERNAL MEDICINE

## 2019-03-04 PROCEDURE — 36415 COLL VENOUS BLD VENIPUNCTURE: CPT

## 2019-03-04 RX ORDER — PROCHLORPERAZINE MALEATE 5 MG
5 TABLET ORAL
Qty: 20 TAB | Refills: 0 | Status: SHIPPED | OUTPATIENT
Start: 2019-03-04 | End: 2019-03-11

## 2019-03-04 RX ORDER — OXYCODONE AND ACETAMINOPHEN 5; 325 MG/1; MG/1
1 TABLET ORAL
Qty: 20 TAB | Refills: 0 | Status: SHIPPED | OUTPATIENT
Start: 2019-03-04 | End: 2019-03-09

## 2019-03-04 RX ORDER — SULFAMETHOXAZOLE AND TRIMETHOPRIM 800; 160 MG/1; MG/1
1 TABLET ORAL 2 TIMES DAILY
Qty: 8 TAB | Refills: 0 | Status: SHIPPED | OUTPATIENT
Start: 2019-03-04 | End: 2019-03-08

## 2019-03-04 RX ADMIN — FLUTICASONE FUROATE AND VILANTEROL TRIFENATATE 1 PUFF: 100; 25 POWDER RESPIRATORY (INHALATION) at 08:29

## 2019-03-04 RX ADMIN — Medication 10 ML: at 05:12

## 2019-03-04 RX ADMIN — AMLODIPINE BESYLATE 5 MG: 5 TABLET ORAL at 08:29

## 2019-03-04 RX ADMIN — ACETAMINOPHEN 650 MG: 325 TABLET ORAL at 07:34

## 2019-03-04 NOTE — PROGRESS NOTES
Oncology End of Shift Note Bedside shift change report given to Racquel Hernandez RN (incoming nurse) by Russel Marroquin (outgoing nurse) on [facility]. Report included the following information SBAR, Kardex, MAR and Recent Results. Shift Summary: Patient had concerns regarding blood when wiping, menstrual cycle started, x1 dose PRN pain medication Issues for Physician to Address:   
 
Patient on Cardiac Monitoring? [] Yes 
[x] No 
 
Rhythm:   
 
 
 
Shift Events Russel Marroquin

## 2019-03-04 NOTE — PROGRESS NOTES
Problem: Falls - Risk of 
Goal: *Absence of Falls Document Aftab Monico Fall Risk and appropriate interventions in the flowsheet. Outcome: Progressing Towards Goal 
Fall Risk Interventions: 
  
 
  
 
Medication Interventions: Evaluate medications/consider consulting pharmacy, Patient to call before getting OOB, Teach patient to arise slowly

## 2019-03-04 NOTE — PROGRESS NOTES
Pt IV was removed. RN reviewed all discharge paperwork with pt. RN reviewed and educated pt on medications and side effects. Pt verbalized understanding.

## 2019-03-04 NOTE — DISCHARGE INSTRUCTIONS
Patient Education        Kidney Infection: Care Instructions  Your Care Instructions    A kidney infection (pyelonephritis) is a type of urinary tract infection, or UTI. Most UTIs are bladder infections. Kidney infections tend to make people much sicker than bladder infections do. A kidney infection is also more serious because it can cause lasting damage if it is not treated quickly. Follow-up care is a key part of your treatment and safety. Be sure to make and go to all appointments, and call your doctor if you are having problems. It's also a good idea to know your test results and keep a list of the medicines you take. How can you care for yourself at home? · Take your antibiotics as directed. Do not stop taking them just because you feel better. You need to take the full course of antibiotics. · Drink plenty of water, enough so that your urine is light yellow or clear like water. This may help wash out bacteria that are causing the infection. If you have kidney, heart, or liver disease and have to limit fluids, talk with your doctor before you increase the amount of fluids you drink. · Urinate often. Try to empty your bladder each time. · To relieve pain, take a hot shower or lay a heating pad (set on low) over your lower belly. Never go to sleep with a heating pad in place. Put a thin cloth between the heating pad and your skin. To help prevent kidney infections  · Drink plenty of water each day. This helps you urinate often, which clears bacteria from your system. If you have kidney, heart, or liver disease and have to limit fluids, talk with your doctor before you increase the amount of fluids you drink. · Urinate when you have the urge. Do not hold your urine for a long time. Urinate before you go to sleep. · If you have symptoms of a bladder infection, such as burning when you urinate or having to urinate often, call your doctor so you can treat the problem before it gets worse.  If you do not treat a bladder infection quickly, it can spread to the kidney. · Men should keep the tip of the penis clean. If you are a woman, keep these ideas in mind:  · Urinate right after you have sex. · Change sanitary pads often. Avoid douches, feminine hygiene sprays, and other feminine hygiene products that have deodorants. · After going to the bathroom, wipe from front to back. When should you call for help? Call your doctor now or seek immediate medical care if:    · You have symptoms that a kidney infection is getting worse. These may include:  ? Pain or burning when you urinate. ? A frequent need to urinate without being able to pass much urine. ? Pain in the flank, which is just below the rib cage and above the waist on either side of the back. ? Blood in the urine. ? A fever.     · You are vomiting or nauseated.    Watch closely for changes in your health, and be sure to contact your doctor if:    · You do not get better as expected. Where can you learn more? Go to http://deepak-vince.info/. Enter J080 in the search box to learn more about \"Kidney Infection: Care Instructions. \"  Current as of: 2018  Content Version: 11.9  © 0995-7074 [employer]. Care instructions adapted under license by [employer] (which disclaims liability or warranty for this information). If you have questions about a medical condition or this instruction, always ask your healthcare professional. [place] [address] any warranty or liability for your use of this information. HOSPITALIST DISCHARGE INSTRUCTIONS    NAME: Markie Guerrier   :  1985   MRN:  [id number]     Date/Time:  3/4/2019 7:44 AM    ADMIT DATE: 3/1/2019   DISCHARGE DATE: 3/4/2019         · It is important that you take the medication exactly as they are prescribed.    · Keep your medication in the bottles provided by the pharmacist and keep a list of the medication names, dosages, and times to be taken in your wallet. · Do not take other medications without consulting your doctor. What to do at Home    Recommended diet:  Regular Diet    Recommended activity: Activity as tolerated      If you have questions regarding the hospital related prescriptions or hospital related issues please call [facility] at [phone]. You can always direct your questions to your primary care doctor if you are unable to reach your hospital physician; your PCP works as an extension of your hospital doctor just like your hospital doctor is an extension of your PCP for your time at the hospital [facility], [facility])    If you experience any of the following symptoms then please call your primary care physician or return to the emergency room if you cannot get hold of your doctor:    Fever, chills, nausea, vomiting, or persistent diarrhea  Worsening weakness or new problems with your speech or balance  Dark stools or visible blood in your stools  New Leg swelling or shortness of breath as these could be signs of a clot    Additional Instructions:      Bring these papers with you to your follow up appointments. The papers will help your doctors be sure to continue the care plan from the hospital.              Information obtained by :  I understand that if any problems occur once I am at home I am to contact my physician. I understand and acknowledge receipt of the instructions indicated above.                                                                                                                                            Physician's or R.N.'s Signature                                                                  Date/Time                                                                                                                                              Patient or Representative Signature

## 2019-03-05 LAB
BACTERIA SPEC CULT: NORMAL
CC UR VC: NORMAL
SERVICE CMNT-IMP: NORMAL

## 2019-03-11 NOTE — DISCHARGE SUMMARY
Hospitalist Discharge Summary     Patient ID:  Adolfo Cleary  [id number]  34 y.o.  1985    PCP on record: Unknown, Provider    Admit date: 3/1/2019  Discharge date and time: 3/4/2019      DISCHARGE DIAGNOSIS:  Sepsis due to UTI  Obstructive right ureteral stone and pyonephrosis  Asthma  PCOS  Obesity    CONSULTATIONS:  IP CONSULT TO UROLOGY    Excerpted HPI from H&P of Wm Russo MD:  79-year-old white female  Works as a     No history of prior nephrolithiasis    Well until the day of admission  sleeping after night shift when she suddenly woke up at 3 PM.  Severe right flank pain that radiated around to her right groin  described as a severe 15/10 sharp constant pain  Associated with nausea but no vomiting  Not associated with dysuria, hematuria, fevers, chills  Pain persistent, so she came into the emergency room    ED  Afebrile, hemodynamic is stable  WBC count 17.5, heart rate 98, lactate 1.0  Urinalysis nitrite negative, 20-50 WBCs, greater than 100 RBCs, 2+ bacteria  CT abdomen and pelvis with obstructing 4 mm proximal right ureteral stone with delayed right nephrogram and mild hydronephrosis  Urology contacted recommended admission, n.p.o. after midnight for possible stent  Patient currently comfortable with IV pain medications        ______________________________________________________________________  DISCHARGE SUMMARY/HOSPITAL COURSE:  for full details see H&P, daily progress notes, labs, consult notes. Patient was admitted to the hospital, treated empirically with aztreonam and IV fluid resuscitation per sepsis management protocol, and seen in consultation by urology. The morning after admission, she was taken to the operating suite, where she underwent cystoscopy and placement of a right ureteral stent without incident.  She experienced significant improvement in pain following the procedure, but did experience a temperature elevation to 100.0F postoperatively. Given that she had initially presented with sepsis, it was felt best to continue to monitor her on an inpatient basis to ensure that she was adequately responding to treatment. Once she was completely afebrile for over 24 hours and tolerating diet well, she was discharged to home to complete an empiric course of Bactrim (due to contaminate urine culture on admission) and follow up in urology clinic.   _______________________________________________________________________  Patient seen and examined by me on discharge day. Pertinent Findings:  Gen:    Not in distress, nontoxic  HEENT: MMM, OP clear  Chest: Clear lungs, normal respiratory effort  CVS:   Regular rhythm. No edema  Abd:  Soft, not distended, not tender  Neuro:  Alert, oriented, no focal deficits  _______________________________________________________________________  DISCHARGE MEDICATIONS:   Discharge Medication List as of 3/4/2019  8:41 AM      START taking these medications    Details   trimethoprim-sulfamethoxazole (BACTRIM DS) 160-800 mg per tablet Take 1 Tab by mouth two (2) times a day for 4 days. , Print, Disp-8 Tab, R-0      oxyCODONE-acetaminophen (PERCOCET) 5-325 mg per tablet Take 1 Tab by mouth every six (6) hours as needed for Pain for up to 5 days. Max Daily Amount: 4 Tabs., Print, Disp-20 Tab, R-0      prochlorperazine (COMPAZINE) 5 mg tablet Take 1 Tab by mouth every six (6) hours as needed for Nausea for up to 7 days. , Print, Disp-20 Tab, R-0         CONTINUE these medications which have NOT CHANGED    Details   hydroCHLOROthiazide (HYDRODIURIL) 25 mg tablet Take 25 mg by mouth daily. , Historical Med      budesonide-formoterol (SYMBICORT) 160-4.5 mcg/actuation HFAA Take 2 Puffs by inhalation two (2) times a day., Historical Med      metFORMIN (GLUCOPHAGE) 500 mg tablet Take 500 mg by mouth two (2) times daily (with meals). , Historical Med      desogestrel-ethinyl estradiol (APRI) 0.15-0.03 mg tab Take 1 Tab by mouth daily., Historical Med      ipratropium (ATROVENT) 42 mcg (0.06 %) nasal spray 2 Sprays by Both Nostrils route four (4) times daily. , Historical Med         STOP taking these medications       fluticasone-salmeterol (ADVAIR DISKUS) 100-50 mcg/dose diskus inhaler Comments:   Reason for Stopping:               My Recommended Diet, Activity, Wound Care, and follow-up labs are listed in the patient's Discharge Insturctions which I have personally completed and reviewed.     ______________________________________________________________________    Risk of deterioration: Low    Condition at Discharge:  Stable  ______________________________________________________________________    Disposition  Home with family, no needs  ______________________________________________________________________    Care Plan discussed with:   Patient, RN    ______________________________________________________________________    Code Status: Full Code  ______________________________________________________________________      Follow up with:   PCP : Unknown, Provider  Follow-up Information     Follow up With Specialties Details Why Contact Info    Cesario Villanueva NP Nurse Practitioner Go in 3 days The nurse will contact you with appointment date and time  [address] [address] 035 [facility]  Call today  1500 [facility] [phone]    Unknown, Provider    Patient not available to ask                Total time in minutes spent coordinating this discharge (includes going over instructions, follow-up, prescriptions, and preparing report for sign off to her PCP) :  45 minutes    Signed:  Rema Lawrence MD

## 2022-03-18 PROBLEM — A41.9 SEPSIS (HCC): Status: ACTIVE | Noted: 2019-03-01

## 2023-05-08 ENCOUNTER — TELEPHONE (OUTPATIENT)
Age: 38
End: 2023-05-08

## 2023-07-05 ENCOUNTER — TELEPHONE (OUTPATIENT)
Age: 38
End: 2023-07-05

## 2023-07-05 NOTE — TELEPHONE ENCOUNTER
LVM to notify the patient that her 7/6/23 vv has been rescheduled to 7/20/23 at 4:20. Should this appointment date and time not work for the patient. , return call requested.

## 2023-07-20 ENCOUNTER — TELEMEDICINE (OUTPATIENT)
Age: 38
End: 2023-07-20
Payer: COMMERCIAL

## 2023-07-20 DIAGNOSIS — E66.01 SEVERE OBESITY (BMI >= 40) (HCC): ICD-10-CM

## 2023-07-20 DIAGNOSIS — G47.33 OBSTRUCTIVE SLEEP APNEA (ADULT) (PEDIATRIC): Primary | ICD-10-CM

## 2023-07-20 DIAGNOSIS — I10 PRIMARY HYPERTENSION: ICD-10-CM

## 2023-07-20 PROCEDURE — 99204 OFFICE O/P NEW MOD 45 MIN: CPT | Performed by: INTERNAL MEDICINE

## 2023-07-20 RX ORDER — AZELASTINE 1 MG/ML
SPRAY, METERED NASAL
COMMUNITY

## 2023-07-20 RX ORDER — ALBUTEROL SULFATE 90 UG/1
AEROSOL, METERED RESPIRATORY (INHALATION)
COMMUNITY
Start: 2023-04-25

## 2023-07-20 RX ORDER — LABETALOL 100 MG/1
TABLET, FILM COATED ORAL
COMMUNITY
Start: 2023-07-12

## 2023-07-20 RX ORDER — ALBUTEROL SULFATE 2.5 MG/3ML
SOLUTION RESPIRATORY (INHALATION)
COMMUNITY
Start: 2023-04-25

## 2023-07-20 RX ORDER — FLUTICASONE PROPIONATE 50 MCG
1 SPRAY, SUSPENSION (ML) NASAL DAILY
COMMUNITY
End: 2023-07-20

## 2023-07-20 RX ORDER — BUDESONIDE AND FORMOTEROL FUMARATE DIHYDRATE 160; 4.5 UG/1; UG/1
AEROSOL RESPIRATORY (INHALATION)
COMMUNITY

## 2023-07-20 RX ORDER — HYDROCHLOROTHIAZIDE 25 MG/1
1 TABLET ORAL DAILY
COMMUNITY
Start: 2021-05-22

## 2023-07-20 ASSESSMENT — SLEEP AND FATIGUE QUESTIONNAIRES
ESS TOTAL SCORE: 14
HOW LIKELY ARE YOU TO NOD OFF OR FALL ASLEEP WHILE SITTING AND READING: 2
HOW LIKELY ARE YOU TO NOD OFF OR FALL ASLEEP WHILE WATCHING TV: 2
HOW LIKELY ARE YOU TO NOD OFF OR FALL ASLEEP WHILE SITTING QUIETLY AFTER LUNCH WITHOUT ALCOHOL: 1
HOW LIKELY ARE YOU TO NOD OFF OR FALL ASLEEP WHILE LYING DOWN TO REST IN THE AFTERNOON WHEN CIRCUMSTANCES PERMIT: 2
HOW LIKELY ARE YOU TO NOD OFF OR FALL ASLEEP IN A CAR, WHILE STOPPED FOR A FEW MINUTES IN TRAFFIC: 1
HOW LIKELY ARE YOU TO NOD OFF OR FALL ASLEEP WHILE SITTING AND TALKING TO SOMEONE: 1
HOW LIKELY ARE YOU TO NOD OFF OR FALL ASLEEP WHEN YOU ARE A PASSENGER IN A CAR FOR AN HOUR WITHOUT A BREAK: 3
HOW LIKELY ARE YOU TO NOD OFF OR FALL ASLEEP WHILE SITTING INACTIVE IN A PUBLIC PLACE: 2

## 2023-07-20 NOTE — PROGRESS NOTES
[address] [facility]., Malini Mak, [address]  Tel.  [phone]  Fax. [address]  Tel.  [phone]  Fax. [phone] [address]  Tel.  [phone]  Fax. [phone]       Telemedicine visit performed with verbal consent of the patient. Patient called and identity confirmed with 2 patient identifers  Patient was seen at home  Jennifer Cronin is a 30 y.o. female who was seen by synchronous (real-time) audio-video technology on 7/20/2023. Jennifer Cronin, who was evaluated through a synchronous (real-time) audio-video encounter, and/or her healthcare decision maker, is aware that it is a billable service, which includes applicable co-pays, with coverage as determined by her insurance carrier. She provided verbal consent to proceed and patient identification was verified. This visit was conducted pursuant to the emergency declaration under the [address] authority and the [employer] General Act. A caregiver was present when appropriate. Ability to conduct physical exam was limited. The patient was located at home in a state where the provider was licensed to provide care. --Donis Ram MD on 7/20/2023 at 4:38 PM                                 [address] [facility] Malini Mak, [address]  Tel.  [phone]  Fax. [address]  Tel.  [phone]  Fax. [phone] [address]  Tel.  [phone]  Fax. [phone]         Subjective:        I was at home while conducting this encounter. Jennifer Cronin is an 30 y.o. female  referred by Ms. Johnnie Persaud NP for evaluation for a sleep disorder. She complains of frequent nighttime awakenings associated with excessive daytime sleepiness.   Symptoms began 1 year ago,

## 2023-07-20 NOTE — PATIENT INSTRUCTIONS
1775 [facility]., Malini Mak, [address]  Tel.  [phone]  Fax. 403 [address] [facility], [address] [place] [address]  Tel.  [phone]  Fax. [phone] [facility], [address] [facility]  Tel.  [phone]  Fax. [phone]     Sleep Apnea: After Your Visit  Your Care Instructions  Sleep apnea occurs when you frequently stop breathing for 10 seconds or longer during sleep. It can be mild to severe, based on the number of times per hour that you stop breathing or have slowed breathing. Blocked or narrowed airways in your nose, mouth, or throat can cause sleep apnea. Your airway can become blocked when your throat muscles and tongue relax during sleep. Sleep apnea is common, occurring in 1 out of 20 individuals. Individuals having any of the following characteristics should be evaluated and treated right away due to high risk and detrimental consequences from untreated sleep apnea:  Obesity  Congestive Heart failure  Atrial Fibrillation  Uncontrolled Hypertension  Type II Diabetes  Night-time Arrhythmias  Stroke  Pulmonary Hypertension  High-risk Driving Populations (pilots, truck drivers, etc.)  Patients Considering Weight-loss Surgery    How do you know you have sleep apnea? You probably have sleep apnea if you answer 'yes' to 3 or more of the following questions:  S - Have you been told that you Snore? T - Are you often Tired during the day? O - Has anyone Observed you stop breathing while sleeping? P- Do you have (or are being treated for) high blood Pressure? B - Are you obese (Body Mass Index > 35)? A - Is your Age 48years old or older? N - Is your Neck size greater than 16 inches? G - Are you male Gender? A sleep physician can prescribe a breathing device that prevents tissues in the throat from blocking your airway. Or your doctor may recommend using a dental device (oral breathing device) to help keep your airway open.  In some cases, surgery may

## 2024-03-13 LAB
ABO, EXTERNAL RESULT: NORMAL
HEP B, EXTERNAL RESULT: NEGATIVE
HEPATITIS C ANTIBODY, EXTERNAL RESULT: NORMAL
HIV, EXTERNAL RESULT: NORMAL
RH FACTOR, EXTERNAL RESULT: POSITIVE
RUBELLA TITER, EXTERNAL RESULT: NORMAL
T. PALLIDUM (SYPHILIS) ANTIBODY, EXTERNAL RESULT: NORMAL

## 2024-03-17 LAB
C. TRACHOMATIS, EXTERNAL RESULT: NEGATIVE
N. GONORRHOEAE, EXTERNAL RESULT: NEGATIVE

## 2024-09-15 LAB — GBS, EXTERNAL RESULT: NEGATIVE

## 2024-09-30 ENCOUNTER — HOSPITAL ENCOUNTER (INPATIENT)
Facility: HOSPITAL | Age: 39
LOS: 4 days | Discharge: HOME | End: 2024-10-04
Attending: OBSTETRICS & GYNECOLOGY | Admitting: OBSTETRICS & GYNECOLOGY
Payer: COMMERCIAL

## 2024-09-30 DIAGNOSIS — Z98.891 S/P C-SECTION: Primary | ICD-10-CM

## 2024-09-30 PROBLEM — O10.919 CHRONIC HYPERTENSION AFFECTING PREGNANCY: Status: ACTIVE | Noted: 2024-09-30

## 2024-09-30 LAB
ABO + RH BLD: NORMAL
ALBUMIN SERPL-MCNC: 2.5 G/DL (ref 3.5–5)
ALBUMIN/GLOB SERPL: 0.6 (ref 1.1–2.2)
ALP SERPL-CCNC: 124 U/L (ref 45–117)
ALT SERPL-CCNC: 17 U/L (ref 12–78)
AMPHET UR QL SCN: NEGATIVE
ANION GAP SERPL CALC-SCNC: 10 MMOL/L (ref 2–12)
AST SERPL-CCNC: 10 U/L (ref 15–37)
BARBITURATES UR QL SCN: NEGATIVE
BASOPHILS # BLD: 0.1 K/UL (ref 0–0.1)
BASOPHILS NFR BLD: 1 % (ref 0–1)
BENZODIAZ UR QL: NEGATIVE
BILIRUB SERPL-MCNC: 0.3 MG/DL (ref 0.2–1)
BLOOD GROUP ANTIBODIES SERPL: NORMAL
BUN SERPL-MCNC: 11 MG/DL (ref 6–20)
BUN/CREAT SERPL: 16 (ref 12–20)
CALCIUM SERPL-MCNC: 8.7 MG/DL (ref 8.5–10.1)
CANNABINOIDS UR QL SCN: NEGATIVE
CHLORIDE SERPL-SCNC: 110 MMOL/L (ref 97–108)
CO2 SERPL-SCNC: 17 MMOL/L (ref 21–32)
COCAINE UR QL SCN: NEGATIVE
CREAT SERPL-MCNC: 0.7 MG/DL (ref 0.55–1.02)
DIFFERENTIAL METHOD BLD: ABNORMAL
EOSINOPHIL # BLD: 0.3 K/UL (ref 0–0.4)
EOSINOPHIL NFR BLD: 2 % (ref 0–7)
ERYTHROCYTE [DISTWIDTH] IN BLOOD BY AUTOMATED COUNT: 15.4 % (ref 11.5–14.5)
GLOBULIN SER CALC-MCNC: 4 G/DL (ref 2–4)
GLUCOSE SERPL-MCNC: 109 MG/DL (ref 65–100)
HCT VFR BLD AUTO: 29.9 % (ref 35–47)
HGB BLD-MCNC: 9.6 G/DL (ref 11.5–16)
IMM GRANULOCYTES # BLD AUTO: 0.1 K/UL (ref 0–0.04)
IMM GRANULOCYTES NFR BLD AUTO: 1 % (ref 0–0.5)
LYMPHOCYTES # BLD: 2.6 K/UL (ref 0.8–3.5)
LYMPHOCYTES NFR BLD: 20 % (ref 12–49)
Lab: NORMAL
MCH RBC QN AUTO: 25.2 PG (ref 26–34)
MCHC RBC AUTO-ENTMCNC: 32.1 G/DL (ref 30–36.5)
MCV RBC AUTO: 78.5 FL (ref 80–99)
METHADONE UR QL: NEGATIVE
MONOCYTES # BLD: 0.8 K/UL (ref 0–1)
MONOCYTES NFR BLD: 6 % (ref 5–13)
NEUTS SEG # BLD: 9.3 K/UL (ref 1.8–8)
NEUTS SEG NFR BLD: 70 % (ref 32–75)
NRBC # BLD: 0 K/UL (ref 0–0.01)
NRBC BLD-RTO: 0 PER 100 WBC
OPIATES UR QL: NEGATIVE
PCP UR QL: NEGATIVE
PLATELET # BLD AUTO: 337 K/UL (ref 150–400)
PMV BLD AUTO: 11.8 FL (ref 8.9–12.9)
POTASSIUM SERPL-SCNC: 3.8 MMOL/L (ref 3.5–5.1)
PROT SERPL-MCNC: 6.5 G/DL (ref 6.4–8.2)
RBC # BLD AUTO: 3.81 M/UL (ref 3.8–5.2)
RPR SER QL: NONREACTIVE
SODIUM SERPL-SCNC: 137 MMOL/L (ref 136–145)
SPECIMEN EXP DATE BLD: NORMAL
WBC # BLD AUTO: 13.2 K/UL (ref 3.6–11)

## 2024-09-30 PROCEDURE — 85025 COMPLETE CBC W/AUTO DIFF WBC: CPT

## 2024-09-30 PROCEDURE — 80053 COMPREHEN METABOLIC PANEL: CPT

## 2024-09-30 PROCEDURE — 6360000002 HC RX W HCPCS: Performed by: OBSTETRICS & GYNECOLOGY

## 2024-09-30 PROCEDURE — 6370000000 HC RX 637 (ALT 250 FOR IP): Performed by: OBSTETRICS & GYNECOLOGY

## 2024-09-30 PROCEDURE — 6370000000 HC RX 637 (ALT 250 FOR IP)

## 2024-09-30 PROCEDURE — 36415 COLL VENOUS BLD VENIPUNCTURE: CPT

## 2024-09-30 PROCEDURE — 4A1HXCZ MONITORING OF PRODUCTS OF CONCEPTION, CARDIAC RATE, EXTERNAL APPROACH: ICD-10-PCS | Performed by: OBSTETRICS & GYNECOLOGY

## 2024-09-30 PROCEDURE — 86592 SYPHILIS TEST NON-TREP QUAL: CPT

## 2024-09-30 PROCEDURE — 86901 BLOOD TYPING SEROLOGIC RH(D): CPT

## 2024-09-30 PROCEDURE — 80307 DRUG TEST PRSMV CHEM ANLYZR: CPT

## 2024-09-30 PROCEDURE — 1120000000 HC RM PRIVATE OB

## 2024-09-30 PROCEDURE — 86850 RBC ANTIBODY SCREEN: CPT

## 2024-09-30 PROCEDURE — 86900 BLOOD TYPING SEROLOGIC ABO: CPT

## 2024-09-30 RX ORDER — PANTOPRAZOLE SODIUM 40 MG/1
40 TABLET, DELAYED RELEASE ORAL
Status: DISCONTINUED | OUTPATIENT
Start: 2024-10-01 | End: 2024-10-04 | Stop reason: HOSPADM

## 2024-09-30 RX ORDER — SODIUM CHLORIDE 0.9 % (FLUSH) 0.9 %
5-40 SYRINGE (ML) INJECTION PRN
Status: DISCONTINUED | OUTPATIENT
Start: 2024-09-30 | End: 2024-10-02

## 2024-09-30 RX ORDER — LABETALOL 200 MG/1
200 TABLET, FILM COATED ORAL EVERY 12 HOURS SCHEDULED
Status: DISCONTINUED | OUTPATIENT
Start: 2024-09-30 | End: 2024-10-04 | Stop reason: HOSPADM

## 2024-09-30 RX ORDER — CETIRIZINE HYDROCHLORIDE 10 MG/1
10 TABLET ORAL DAILY
COMMUNITY

## 2024-09-30 RX ORDER — SODIUM CHLORIDE, SODIUM LACTATE, POTASSIUM CHLORIDE, CALCIUM CHLORIDE 600; 310; 30; 20 MG/100ML; MG/100ML; MG/100ML; MG/100ML
INJECTION, SOLUTION INTRAVENOUS CONTINUOUS
Status: DISCONTINUED | OUTPATIENT
Start: 2024-09-30 | End: 2024-10-04

## 2024-09-30 RX ORDER — ONDANSETRON 2 MG/ML
4 INJECTION INTRAMUSCULAR; INTRAVENOUS EVERY 6 HOURS PRN
Status: DISCONTINUED | OUTPATIENT
Start: 2024-09-30 | End: 2024-10-02

## 2024-09-30 RX ORDER — CARBOPROST TROMETHAMINE 250 UG/ML
250 INJECTION, SOLUTION INTRAMUSCULAR PRN
Status: DISCONTINUED | OUTPATIENT
Start: 2024-09-30 | End: 2024-10-04 | Stop reason: HOSPADM

## 2024-09-30 RX ORDER — ACETAMINOPHEN 325 MG/1
650 TABLET ORAL EVERY 4 HOURS PRN
Status: DISCONTINUED | OUTPATIENT
Start: 2024-09-30 | End: 2024-10-02

## 2024-09-30 RX ORDER — ZOLPIDEM TARTRATE 5 MG/1
5 TABLET ORAL NIGHTLY PRN
Status: DISCONTINUED | OUTPATIENT
Start: 2024-09-30 | End: 2024-10-04 | Stop reason: HOSPADM

## 2024-09-30 RX ORDER — SODIUM CHLORIDE 0.9 % (FLUSH) 0.9 %
5-40 SYRINGE (ML) INJECTION EVERY 12 HOURS SCHEDULED
Status: DISCONTINUED | OUTPATIENT
Start: 2024-09-30 | End: 2024-10-01

## 2024-09-30 RX ORDER — ASPIRIN 81 MG/1
81 TABLET, CHEWABLE ORAL DAILY
Status: ON HOLD | COMMUNITY
End: 2024-10-04 | Stop reason: HOSPADM

## 2024-09-30 RX ORDER — METHYLERGONOVINE MALEATE 0.2 MG/ML
200 INJECTION INTRAVENOUS PRN
Status: DISCONTINUED | OUTPATIENT
Start: 2024-09-30 | End: 2024-10-04 | Stop reason: HOSPADM

## 2024-09-30 RX ORDER — CETIRIZINE HYDROCHLORIDE 10 MG/1
10 TABLET ORAL DAILY
Status: DISCONTINUED | OUTPATIENT
Start: 2024-10-01 | End: 2024-10-04 | Stop reason: HOSPADM

## 2024-09-30 RX ORDER — MISOPROSTOL 200 UG/1
400 TABLET ORAL PRN
Status: DISCONTINUED | OUTPATIENT
Start: 2024-09-30 | End: 2024-10-02

## 2024-09-30 RX ORDER — AZELASTINE 1 MG/ML
2 SPRAY, METERED NASAL DAILY
Status: DISCONTINUED | OUTPATIENT
Start: 2024-09-30 | End: 2024-09-30 | Stop reason: CLARIF

## 2024-09-30 RX ORDER — SODIUM CHLORIDE, SODIUM LACTATE, POTASSIUM CHLORIDE, AND CALCIUM CHLORIDE .6; .31; .03; .02 G/100ML; G/100ML; G/100ML; G/100ML
500 INJECTION, SOLUTION INTRAVENOUS PRN
Status: DISCONTINUED | OUTPATIENT
Start: 2024-09-30 | End: 2024-10-02

## 2024-09-30 RX ORDER — ONDANSETRON 4 MG/1
4 TABLET, ORALLY DISINTEGRATING ORAL EVERY 6 HOURS PRN
Status: DISCONTINUED | OUTPATIENT
Start: 2024-09-30 | End: 2024-10-04 | Stop reason: HOSPADM

## 2024-09-30 RX ORDER — TERBUTALINE SULFATE 1 MG/ML
0.25 INJECTION, SOLUTION SUBCUTANEOUS
Status: ACTIVE | OUTPATIENT
Start: 2024-09-30 | End: 2024-10-01

## 2024-09-30 RX ORDER — FENTANYL CITRATE 50 UG/ML
25 INJECTION, SOLUTION INTRAMUSCULAR; INTRAVENOUS
Status: DISCONTINUED | OUTPATIENT
Start: 2024-09-30 | End: 2024-10-02

## 2024-09-30 RX ORDER — SODIUM CHLORIDE 9 MG/ML
25 INJECTION, SOLUTION INTRAVENOUS PRN
Status: DISCONTINUED | OUTPATIENT
Start: 2024-09-30 | End: 2024-10-02

## 2024-09-30 RX ADMIN — FENTANYL CITRATE 25 MCG: 50 INJECTION INTRAMUSCULAR; INTRAVENOUS at 20:20

## 2024-09-30 RX ADMIN — Medication 25 MCG: at 10:05

## 2024-09-30 RX ADMIN — ONDANSETRON 4 MG: 2 INJECTION INTRAMUSCULAR; INTRAVENOUS at 20:20

## 2024-09-30 RX ADMIN — ZOLPIDEM TARTRATE 5 MG: 5 TABLET, COATED ORAL at 20:20

## 2024-09-30 RX ADMIN — LABETALOL HYDROCHLORIDE 200 MG: 200 TABLET, FILM COATED ORAL at 20:19

## 2024-09-30 RX ADMIN — Medication 25 MCG: at 21:04

## 2024-09-30 RX ADMIN — FENTANYL CITRATE 25 MCG: 50 INJECTION INTRAMUSCULAR; INTRAVENOUS at 23:09

## 2024-09-30 ASSESSMENT — PAIN SCALES - GENERAL
PAINLEVEL_OUTOF10: 6
PAINLEVEL_OUTOF10: 6

## 2024-09-30 NOTE — H&P
History & Physical    Name: Tiffanie Pierre MRN: [id number]  SSN: [id number]    YOB: 1985  Age: 39 y.o.  Sex: female      Subjective:     Chief Complaint:  Pregnancy and Chronic Hypertension    Estimated Date of Delivery: 10/10/24  OB History    Para Term  AB Living   2       1     SAB IAB Ectopic Molar Multiple Live Births     1              # Outcome Date GA Lbr Abiel/2nd Weight Sex Type Anes PTL Lv   2 Current            1 IAB                Ms. Pierre is admitted with pregnancy at 38w4d for induction of labor due to chronic htn, controlled on oral labetalol with reassuring surveillance.  Currently on labetalol 200 bid. Pt also has asthma.  She takes metformin for pcos.  She had a normal glucola. BMI 51.  Growth u/s last week had efw 7#9oz.  Please see prenatal records which have also been sent to Labor and Delivery and added to Epic for details.    Past Medical History:   Diagnosis Date    Asthma     Chronic kidney disease     Hypertension     PCOS (polycystic ovarian syndrome)      Past Surgical History:   Procedure Laterality Date    WISDOM TOOTH EXTRACTION       Social History     Occupational History    Not on file   Tobacco Use    Smoking status: Former     Types: Cigarettes    Smokeless tobacco: Never   Vaping Use    Vaping status: Never Used   Substance and Sexual Activity    Alcohol use: Not Currently     Comment: rare    Drug use: Never    Sexual activity: Not on file     Family History   Problem Relation Age of Onset    Hypertension Mother     Hypertension Father        Allergies   Allergen Reactions    Other Shortness Of Breath     NyQucourtney Elizabeth [Cefaclor]     Dm-Doxylamine-Acetaminophen      Childhood, reaction not documented  Childhood, reaction not documented       Prior to Admission medications    Medication Sig Start Date End Date Taking? Authorizing Provider   omeprazole (PRILOSEC) 20 MG delayed release capsule Take 1 capsule by mouth daily   Yes Provider,

## 2024-09-30 NOTE — PROGRESS NOTES
1905- bedside report with JERRICA Jeffery RN. Care assumed of pt. Patient breathing through ctx with balloon in place.     1915- Dr Grant updated on current BP reading and holding 2000 Labetalol dose for /65. MD will be to bedside for evaluation.     1920- Dr. Grant at bedside for evaluation. Discussed with patient POC for tonight and discussed the use of Ambien for sleep and Fentanyl for pain. Patient agreeable with plan.     2005- Patient requesting IV pain meds and Ambien for sleep. Patient reports ctx are better but would still like something for pain.     2025- Patient reports ctx have spaced out. Advised pt I will give Miso at 2100.     2310- RN pulled on balloon; still remains in place.     0115- Balloon out with gentle tug. Patient back to bed until 0500. Patient continues to ctx irregularly.     0439- Patient off the monitor and up OOB to shower.     0550- Dr. Grant at bedside for evaluation sp/ Goldman and Miso. Plan is start Pit with AROM. Patient agreeable to plan.

## 2024-09-30 NOTE — PROGRESS NOTES
[place] of care note.  MLB-SBAR received from Dr Perez.      Assuming care of this 30 yo  at 38+4 admitted for IOL due to CHTN (Labetalol).  PNC also c/b morbid obesity (BMI 52) and AMA.  Pt is s/p 1 dose vaginal miso today to facilitate CRB placement at 1630 today by Dr Perez.  Plan for pitocin and AROM in AM.    S:  Pt c/o ctx's q 2-3 min since Cook placement rated 6-7/10.    O:  137/65, 83, 16, 99.9, 100%RA  Gen:  Morbidly obese, NAD, A&O  FHT:  130 baseline, moderate variability, +15x15 accels, no decels, Cat I/reactive  [place]:  uterine irritability  SVE:  deferred (CRB in place)    A:  38 yo  at 38+4.  IOL due to CHTN.  PNC also c/b morbid obesity and AMA.  Reassuring fetal status.  GBS neg.    P:  1.  Continue to monitor/manage IOL  2.  Remove Cook at 0430 if remains in situ  3.  Buccal miso overnight as per Dr Perez if ctx's post Cook placement spread out  4.  GBS proph not indicated  5.  Plan Pitocin and/or AROM in AM  6.  Epidural in labor prn  7.  Anticipate vaginal delivery  8.   for standard fetal/maternal indications

## 2024-09-30 NOTE — PLAN OF CARE
Problem: Pain  Goal: Verbalizes/displays adequate comfort level or baseline comfort level  Outcome: Progressing     Problem: Infection - Adult  Goal: Absence of infection during hospitalization  Outcome: Progressing     Problem: Safety - Adult  Goal: Free from fall injury  Outcome: Progressing     Problem: Chronic Conditions and Co-morbidities  Goal: Patient's chronic conditions and co-morbidity symptoms are monitored and maintained or improved  Outcome: Progressing

## 2024-09-30 NOTE — PROGRESS NOTES
With speculum in place cook catheter placed through cervical os and filled with 60/60 fluid.  Will use buccal miso overnight.

## 2024-09-30 NOTE — PROGRESS NOTES
0816: Tiffanie Pierre is a 39 y.o.  at 38w4d patient of Dr Lei at [facility] who presents to L&D for IOL for hypertension. She reports Positive FM, denies vaginal bleeding, LOF, and contractions. She also denies Headaches, Scotoma, RUQ pain, and Edema. Urine sample obtained. EFM and toco placed for initial assessment.    1005: Dr. Perez at bedside to eval pt. SVE performed by MD with pt permission and 25 mcg of cytotec inserted for cervical ripening.    1625: SVE by  and cook sims balloon placed, 60/60. RN ok to take pt off EFM after reactive strip.    1657: Pt off EFM, ok per Dr. Perze.

## 2024-10-01 ENCOUNTER — ANESTHESIA EVENT (OUTPATIENT)
Dept: LABOR AND DELIVERY | Facility: HOSPITAL | Age: 39
End: 2024-10-01
Payer: COMMERCIAL

## 2024-10-01 ENCOUNTER — ANESTHESIA (OUTPATIENT)
Dept: LABOR AND DELIVERY | Facility: HOSPITAL | Age: 39
End: 2024-10-01
Payer: COMMERCIAL

## 2024-10-01 PROCEDURE — 94640 AIRWAY INHALATION TREATMENT: CPT

## 2024-10-01 PROCEDURE — 6370000000 HC RX 637 (ALT 250 FOR IP): Performed by: OBSTETRICS & GYNECOLOGY

## 2024-10-01 PROCEDURE — 2500000003 HC RX 250 WO HCPCS

## 2024-10-01 PROCEDURE — 2580000003 HC RX 258: Performed by: OBSTETRICS & GYNECOLOGY

## 2024-10-01 PROCEDURE — 2580000003 HC RX 258

## 2024-10-01 PROCEDURE — 6370000000 HC RX 637 (ALT 250 FOR IP): Performed by: STUDENT IN AN ORGANIZED HEALTH CARE EDUCATION/TRAINING PROGRAM

## 2024-10-01 PROCEDURE — 2709999900 HC NON-CHARGEABLE SUPPLY: Performed by: OBSTETRICS & GYNECOLOGY

## 2024-10-01 PROCEDURE — 3609079900 HC CESAREAN SECTION: Performed by: OBSTETRICS & GYNECOLOGY

## 2024-10-01 PROCEDURE — 6360000002 HC RX W HCPCS: Performed by: OBSTETRICS & GYNECOLOGY

## 2024-10-01 PROCEDURE — 3700000000 HC ANESTHESIA ATTENDED CARE: Performed by: OBSTETRICS & GYNECOLOGY

## 2024-10-01 PROCEDURE — 6360000002 HC RX W HCPCS

## 2024-10-01 PROCEDURE — 1120000000 HC RM PRIVATE OB

## 2024-10-01 PROCEDURE — 7100000000 HC PACU RECOVERY - FIRST 15 MIN: Performed by: OBSTETRICS & GYNECOLOGY

## 2024-10-01 PROCEDURE — 7100000001 HC PACU RECOVERY - ADDTL 15 MIN: Performed by: OBSTETRICS & GYNECOLOGY

## 2024-10-01 PROCEDURE — 6360000002 HC RX W HCPCS: Performed by: STUDENT IN AN ORGANIZED HEALTH CARE EDUCATION/TRAINING PROGRAM

## 2024-10-01 PROCEDURE — 88342 IMHCHEM/IMCYTCHM 1ST ANTB: CPT

## 2024-10-01 PROCEDURE — 3700000025 EPIDURAL BLOCK: Performed by: STUDENT IN AN ORGANIZED HEALTH CARE EDUCATION/TRAINING PROGRAM

## 2024-10-01 PROCEDURE — 3700000001 HC ADD 15 MINUTES (ANESTHESIA): Performed by: OBSTETRICS & GYNECOLOGY

## 2024-10-01 PROCEDURE — 88304 TISSUE EXAM BY PATHOLOGIST: CPT

## 2024-10-01 PROCEDURE — 2500000003 HC RX 250 WO HCPCS: Performed by: STUDENT IN AN ORGANIZED HEALTH CARE EDUCATION/TRAINING PROGRAM

## 2024-10-01 PROCEDURE — 3E0DXGC INTRODUCTION OF OTHER THERAPEUTIC SUBSTANCE INTO MOUTH AND PHARYNX, EXTERNAL APPROACH: ICD-10-PCS | Performed by: OBSTETRICS & GYNECOLOGY

## 2024-10-01 RX ORDER — SODIUM CHLORIDE, SODIUM LACTATE, POTASSIUM CHLORIDE, CALCIUM CHLORIDE 600; 310; 30; 20 MG/100ML; MG/100ML; MG/100ML; MG/100ML
INJECTION, SOLUTION INTRAVENOUS
Status: DISCONTINUED | OUTPATIENT
Start: 2024-10-01 | End: 2024-10-01 | Stop reason: SDUPTHER

## 2024-10-01 RX ORDER — OXYTOCIN/RINGER'S LACTATE 30/500 ML
PLASTIC BAG, INJECTION (ML) INTRAVENOUS
Status: DISCONTINUED | OUTPATIENT
Start: 2024-10-01 | End: 2024-10-01 | Stop reason: SDUPTHER

## 2024-10-01 RX ORDER — NALOXONE HYDROCHLORIDE 0.4 MG/ML
INJECTION, SOLUTION INTRAMUSCULAR; INTRAVENOUS; SUBCUTANEOUS PRN
Status: DISCONTINUED | OUTPATIENT
Start: 2024-10-01 | End: 2024-10-02

## 2024-10-01 RX ORDER — MORPHINE SULFATE 1 MG/ML
INJECTION, SOLUTION EPIDURAL; INTRATHECAL; INTRAVENOUS
Status: DISCONTINUED | OUTPATIENT
Start: 2024-10-01 | End: 2024-10-01 | Stop reason: SDUPTHER

## 2024-10-01 RX ORDER — FENTANYL/BUPIVACAINE/NS/PF 2-1250MCG
10 PLASTIC BAG, INJECTION (ML) INJECTION CONTINUOUS
Status: DISCONTINUED | OUTPATIENT
Start: 2024-10-01 | End: 2024-10-02

## 2024-10-01 RX ORDER — LIDOCAINE HYDROCHLORIDE AND EPINEPHRINE BITARTRATE 20; .01 MG/ML; MG/ML
INJECTION, SOLUTION SUBCUTANEOUS
Status: DISCONTINUED | OUTPATIENT
Start: 2024-10-01 | End: 2024-10-01 | Stop reason: SDUPTHER

## 2024-10-01 RX ORDER — ONDANSETRON 2 MG/ML
4 INJECTION INTRAMUSCULAR; INTRAVENOUS EVERY 6 HOURS PRN
Status: DISCONTINUED | OUTPATIENT
Start: 2024-10-01 | End: 2024-10-02

## 2024-10-01 RX ORDER — SODIUM CHLORIDE 9 MG/ML
INJECTION, SOLUTION INTRAVENOUS PRN
Status: DISCONTINUED | OUTPATIENT
Start: 2024-10-01 | End: 2024-10-03

## 2024-10-01 RX ORDER — ACETAMINOPHEN 500 MG
1000 TABLET ORAL EVERY 8 HOURS SCHEDULED
Status: DISCONTINUED | OUTPATIENT
Start: 2024-10-01 | End: 2024-10-01

## 2024-10-01 RX ORDER — EPHEDRINE SULFATE/0.9% NACL/PF 25 MG/5 ML
10 SYRINGE (ML) INTRAVENOUS
Status: DISCONTINUED | OUTPATIENT
Start: 2024-10-01 | End: 2024-10-02

## 2024-10-01 RX ORDER — SODIUM CHLORIDE 0.9 % (FLUSH) 0.9 %
5-40 SYRINGE (ML) INJECTION EVERY 12 HOURS SCHEDULED
Status: DISCONTINUED | OUTPATIENT
Start: 2024-10-01 | End: 2024-10-04

## 2024-10-01 RX ORDER — ONDANSETRON 4 MG/1
4 TABLET, ORALLY DISINTEGRATING ORAL EVERY 8 HOURS PRN
Status: DISCONTINUED | OUTPATIENT
Start: 2024-10-01 | End: 2024-10-04

## 2024-10-01 RX ORDER — FENTANYL/BUPIVACAINE/NS/PF 2-1250MCG
PLASTIC BAG, INJECTION (ML) INJECTION
Status: COMPLETED
Start: 2024-10-01 | End: 2024-10-01

## 2024-10-01 RX ORDER — GINSENG 100 MG
CAPSULE ORAL 4 TIMES DAILY PRN
Status: DISCONTINUED | OUTPATIENT
Start: 2024-10-01 | End: 2024-10-04 | Stop reason: HOSPADM

## 2024-10-01 RX ORDER — KETOROLAC TROMETHAMINE 30 MG/ML
INJECTION, SOLUTION INTRAMUSCULAR; INTRAVENOUS
Status: DISCONTINUED | OUTPATIENT
Start: 2024-10-01 | End: 2024-10-01 | Stop reason: SDUPTHER

## 2024-10-01 RX ORDER — OXYCODONE HYDROCHLORIDE 5 MG/1
10 TABLET ORAL EVERY 4 HOURS PRN
Status: DISCONTINUED | OUTPATIENT
Start: 2024-10-01 | End: 2024-10-04 | Stop reason: HOSPADM

## 2024-10-01 RX ORDER — KETOROLAC TROMETHAMINE 30 MG/ML
30 INJECTION, SOLUTION INTRAMUSCULAR; INTRAVENOUS EVERY 6 HOURS
Status: DISPENSED | OUTPATIENT
Start: 2024-10-01 | End: 2024-10-02

## 2024-10-01 RX ORDER — DIPHENHYDRAMINE HYDROCHLORIDE 50 MG/ML
12.5 INJECTION INTRAMUSCULAR; INTRAVENOUS EVERY 4 HOURS PRN
Status: DISCONTINUED | OUTPATIENT
Start: 2024-10-01 | End: 2024-10-02

## 2024-10-01 RX ORDER — LANOLIN/MINERAL OIL
LOTION (ML) TOPICAL
Status: DISCONTINUED | OUTPATIENT
Start: 2024-10-01 | End: 2024-10-04

## 2024-10-01 RX ORDER — FENTANYL/BUPIVACAINE/NS/PF 2-1250MCG
10 PLASTIC BAG, INJECTION (ML) INJECTION CONTINUOUS
Status: DISCONTINUED | OUTPATIENT
Start: 2024-10-01 | End: 2024-10-01

## 2024-10-01 RX ORDER — DOCUSATE SODIUM 100 MG/1
100 CAPSULE, LIQUID FILLED ORAL 2 TIMES DAILY
Status: DISCONTINUED | OUTPATIENT
Start: 2024-10-01 | End: 2024-10-04 | Stop reason: HOSPADM

## 2024-10-01 RX ORDER — SODIUM CHLORIDE 0.9 % (FLUSH) 0.9 %
5-40 SYRINGE (ML) INJECTION EVERY 12 HOURS SCHEDULED
Status: DISCONTINUED | OUTPATIENT
Start: 2024-10-01 | End: 2024-10-02

## 2024-10-01 RX ORDER — SODIUM CHLORIDE 0.9 % (FLUSH) 0.9 %
5-40 SYRINGE (ML) INJECTION PRN
Status: DISCONTINUED | OUTPATIENT
Start: 2024-10-01 | End: 2024-10-03

## 2024-10-01 RX ORDER — EPHEDRINE SULFATE/0.9% NACL/PF 25 MG/5 ML
5 SYRINGE (ML) INTRAVENOUS PRN
Status: DISCONTINUED | OUTPATIENT
Start: 2024-10-02 | End: 2024-10-02

## 2024-10-01 RX ORDER — IBUPROFEN 400 MG/1
800 TABLET, FILM COATED ORAL EVERY 8 HOURS
Status: DISCONTINUED | OUTPATIENT
Start: 2024-10-02 | End: 2024-10-03

## 2024-10-01 RX ORDER — ACETAMINOPHEN 500 MG
1000 TABLET ORAL EVERY 8 HOURS SCHEDULED
Status: DISCONTINUED | OUTPATIENT
Start: 2024-10-01 | End: 2024-10-04 | Stop reason: HOSPADM

## 2024-10-01 RX ORDER — ONDANSETRON 2 MG/ML
INJECTION INTRAMUSCULAR; INTRAVENOUS
Status: DISCONTINUED | OUTPATIENT
Start: 2024-10-01 | End: 2024-10-01 | Stop reason: SDUPTHER

## 2024-10-01 RX ORDER — BUPIVACAINE HYDROCHLORIDE 2.5 MG/ML
INJECTION, SOLUTION EPIDURAL; INFILTRATION; INTRACAUDAL
Status: COMPLETED
Start: 2024-10-01 | End: 2024-10-01

## 2024-10-01 RX ORDER — BUPIVACAINE HYDROCHLORIDE 2.5 MG/ML
INJECTION, SOLUTION EPIDURAL; INFILTRATION; INTRACAUDAL
Status: DISCONTINUED | OUTPATIENT
Start: 2024-10-01 | End: 2024-10-01 | Stop reason: SDUPTHER

## 2024-10-01 RX ORDER — OXYCODONE HYDROCHLORIDE 5 MG/1
5 TABLET ORAL EVERY 4 HOURS PRN
Status: DISCONTINUED | OUTPATIENT
Start: 2024-10-01 | End: 2024-10-04 | Stop reason: HOSPADM

## 2024-10-01 RX ORDER — ONDANSETRON 2 MG/ML
4 INJECTION INTRAMUSCULAR; INTRAVENOUS EVERY 6 HOURS PRN
Status: DISCONTINUED | OUTPATIENT
Start: 2024-10-01 | End: 2024-10-04

## 2024-10-01 RX ADMIN — LIDOCAINE HYDROCHLORIDE AND EPINEPHRINE BITARTRATE 5 ML: 20; .01 INJECTION, SOLUTION SUBCUTANEOUS at 16:44

## 2024-10-01 RX ADMIN — Medication 909 MILLI-UNITS/MIN: at 17:06

## 2024-10-01 RX ADMIN — LIDOCAINE HYDROCHLORIDE AND EPINEPHRINE BITARTRATE 5 ML: 20; .01 INJECTION, SOLUTION SUBCUTANEOUS at 16:41

## 2024-10-01 RX ADMIN — SODIUM CHLORIDE, POTASSIUM CHLORIDE, SODIUM LACTATE AND CALCIUM CHLORIDE: 600; 310; 30; 20 INJECTION, SOLUTION INTRAVENOUS at 05:53

## 2024-10-01 RX ADMIN — PANTOPRAZOLE SODIUM 40 MG: 40 TABLET, DELAYED RELEASE ORAL at 08:17

## 2024-10-01 RX ADMIN — LIDOCAINE HYDROCHLORIDE AND EPINEPHRINE BITARTRATE 5 ML: 20; .01 INJECTION, SOLUTION SUBCUTANEOUS at 16:38

## 2024-10-01 RX ADMIN — LABETALOL HYDROCHLORIDE 200 MG: 200 TABLET, FILM COATED ORAL at 08:17

## 2024-10-01 RX ADMIN — SODIUM CHLORIDE, POTASSIUM CHLORIDE, SODIUM LACTATE AND CALCIUM CHLORIDE: 600; 310; 30; 20 INJECTION, SOLUTION INTRAVENOUS at 19:17

## 2024-10-01 RX ADMIN — Medication 10 ML/HR: at 13:41

## 2024-10-01 RX ADMIN — LIDOCAINE HYDROCHLORIDE AND EPINEPHRINE BITARTRATE 5 ML: 20; .01 INJECTION, SOLUTION SUBCUTANEOUS at 16:48

## 2024-10-01 RX ADMIN — SODIUM CHLORIDE, POTASSIUM CHLORIDE, SODIUM LACTATE AND CALCIUM CHLORIDE: 600; 310; 30; 20 INJECTION, SOLUTION INTRAVENOUS at 16:57

## 2024-10-01 RX ADMIN — DOCUSATE SODIUM 100 MG: 100 CAPSULE, LIQUID FILLED ORAL at 21:55

## 2024-10-01 RX ADMIN — BUPIVACAINE HYDROCHLORIDE 5 ML: 2.5 INJECTION, SOLUTION EPIDURAL; INFILTRATION; INTRACAUDAL; PERINEURAL at 13:26

## 2024-10-01 RX ADMIN — AZITHROMYCIN MONOHYDRATE 500 MG: 500 INJECTION, POWDER, LYOPHILIZED, FOR SOLUTION INTRAVENOUS at 16:35

## 2024-10-01 RX ADMIN — KETOROLAC TROMETHAMINE 30 MG: 30 INJECTION, SOLUTION INTRAMUSCULAR at 17:28

## 2024-10-01 RX ADMIN — BUPIVACAINE HYDROCHLORIDE 3 ML: 2.5 INJECTION, SOLUTION EPIDURAL; INFILTRATION; INTRACAUDAL; PERINEURAL at 13:30

## 2024-10-01 RX ADMIN — Medication 25 MCG: at 00:56

## 2024-10-01 RX ADMIN — OXYTOCIN 1 MILLI-UNITS/MIN: 10 INJECTION, SOLUTION INTRAMUSCULAR; INTRAVENOUS at 06:15

## 2024-10-01 RX ADMIN — LABETALOL HYDROCHLORIDE 200 MG: 200 TABLET, FILM COATED ORAL at 21:55

## 2024-10-01 RX ADMIN — ONDANSETRON 4 MG: 2 INJECTION INTRAMUSCULAR; INTRAVENOUS at 17:12

## 2024-10-01 RX ADMIN — ACETAMINOPHEN 1000 MG: 500 TABLET ORAL at 19:14

## 2024-10-01 RX ADMIN — ARFORMOTEROL TARTRATE: 15 SOLUTION RESPIRATORY (INHALATION) at 23:00

## 2024-10-01 RX ADMIN — MORPHINE SULFATE 3 MG: 1 INJECTION, SOLUTION EPIDURAL; INTRATHECAL; INTRAVENOUS at 17:09

## 2024-10-01 RX ADMIN — CEFAZOLIN 3000 MG: 10 INJECTION, POWDER, FOR SOLUTION INTRAVENOUS at 16:40

## 2024-10-01 RX ADMIN — PHENYLEPHRINE HYDROCHLORIDE 40 MCG/MIN: 10 INJECTION INTRAVENOUS at 17:16

## 2024-10-01 RX ADMIN — CETIRIZINE HYDROCHLORIDE 10 MG: 10 TABLET, FILM COATED ORAL at 08:17

## 2024-10-01 RX ADMIN — ARFORMOTEROL TARTRATE: 15 SOLUTION RESPIRATORY (INHALATION) at 09:34

## 2024-10-01 RX ADMIN — SODIUM CHLORIDE, POTASSIUM CHLORIDE, SODIUM LACTATE AND CALCIUM CHLORIDE: 600; 310; 30; 20 INJECTION, SOLUTION INTRAVENOUS at 12:44

## 2024-10-01 RX ADMIN — Medication 1 EACH: at 17:40

## 2024-10-01 ASSESSMENT — PAIN SCALES - GENERAL: PAINLEVEL_OUTOF10: 3

## 2024-10-01 ASSESSMENT — PAIN DESCRIPTION - LOCATION: LOCATION: ABDOMEN

## 2024-10-01 NOTE — PROGRESS NOTES
S:  Pt without c/o.  Able to sleep overnight.  Cook spontaneously out around 0200.  Feeling better post shower.  Undecided on epidural in labor.  Received 2 doses of miso overnight.    O:  130/68, 114, 18, 99.0, 98%RA  Gen:  Morbidly obese, NAD, A&O  FHT:  135 baseline, moderate variability, +15x15 accels, no decels, Cat I/reactive  [place]:  irregular ctx's (last miso at 0100)  SVE:  5/50/-3/BB/cephalic  AROM:  clear    A:  40 yo  at 38+5.  IOL due to CHTN.  Reassuring fetal status.  Obesity.  GBS neg.    P:  1.  Continue to monitor/manage labor  2.  Start pitocin per protocol  3.  Epidural prn  4.  Anticipate vaginal delivery

## 2024-10-01 NOTE — OP NOTE
Operative Note    Name: Tiffanie Pierre   Medical Record Number: [id number]   YOB: 1985  Today's Date: 2024      Pre-operative Diagnosis: IUP at 38 weeks, chtn, arrest of dilation after IOL, genital wart    Post-operative Diagnosis: Same     Operation: Primary Low Transverse  Section, excision of genital wart Procedure(s):   SECTION    Surgeon(s):  Judy Lei MD    Assistant:  Nayely Kim    Anesthesia: Epidural    Prophylactic Antibiotics: Ancef 3gm, azithromycin  DVT Prophylaxis: Sequential Compression Devices         Fetal Description: ruiz     Birth Information:   Information for the patient's :  Josef Pierre [[id number]]   @[id number]@    Umbilical Cord: 3 vessels    Placenta:  expressed    Estimated Blood Loss (ml):  600cc    Specimens: None           Complications:  None    Procedure Detail:      After proper patient identification and consent, the patient was taken to the operating room, where epidural anesthesia was administered and found to be adequate. [place] catheter had been placed using sterile technique. The patients pannus was elevated and the pannus retractor was placed. The patient was prepped and draped in the normal sterile fashion.The abdomen was entered using the Danilo Alonso technique. The peritoneum was entered sharply well superior to the bladder without any apparent injury. The Huseyin retractor was placed.  A low transverse uterine incision was made with the scalpel and extended with blunt finger dissection.  The baby’s head was then delivered atraumatically. The remainder of the body was delivered. The cord was clamped and cut and the baby was handed off to Nursing staff in attendance. Placenta was then removed from the uterus. The uterus was curettaged with a lap pad and cleared of all clots and debris. The uterine incision was closed with 0 monocryl in running locking fashion, followed by a second

## 2024-10-01 NOTE — PROGRESS NOTES
Breathing through contractions.  BP (!) 143/73   Pulse 77   Temp 97.6 °F (36.4 °C) (Oral)   Resp 18   Ht 1.6 m (5' 3\")   Wt 134.3 kg (296 lb)   LMP  (Exact Date)   SpO2 99%   BMI 52.43 kg/m²   , moderate variability, + accels, cat 1 (previously cat 2 with variables that improved)  Sve 6/60/-1   [place] q 2 min on 14 pit, mvus 160-180    40 yo  at 38+4. IOL due to CHTN   - IOL - sp miso/cook, sp arom at 0545. Continue to titrate pitocin to adequacy.   - chtn - labs normal on admit. Bps normal to mild range on home labetalol 200bid.  - asthma - on symbicort BID. Albuterol prn  - pcos - restart metformin pp for milk production  - FSR/gbs neg/efw 7lb9oz (67%) on

## 2024-10-01 NOTE — ANESTHESIA PRE PROCEDURE
Department of Anesthesiology  Preprocedure Note       Name:  Tiffanie Pierre   Age:  39 y.o.  :  1985                                          MRN:  [id number]         Date:  10/1/2024      Surgeon: * No surgeons listed *    Procedure: * No procedures listed *    Medications prior to admission:   Prior to Admission medications    Medication Sig Start Date End Date Taking? Authorizing Provider   omeprazole (PRILOSEC) 20 MG delayed release capsule Take 1 capsule by mouth daily   Yes Abraham Macias MD   Prenatal Vit-Fe Fumarate-FA (PRENATAL PO) Take by mouth   Yes Abraham Macias MD   cetirizine (ZYRTEC) 10 MG tablet Take 1 tablet by mouth daily   Yes Abraham Macias MD   aspirin 81 MG chewable tablet Take 1 tablet by mouth daily   Yes Abraham Macias MD   albuterol sulfate HFA (PROVENTIL;VENTOLIN;PROAIR) 108 (90 Base) MCG/ACT inhaler INHALE 2 PUFFS BY MOUTH EVERY 4 HOURS AS NEEDED AND 20-30 MIN BEFORE EXERCISE 23  Yes Abraham Macias MD   albuterol (PROVENTIL) (2.5 MG/3ML) 0.083% nebulizer solution USE 1 VIAL VIA NEBULIZER ONCE EVERY 4 HRS AS NEEDED 23  Yes Abraham Macias MD   azelastine (ASTELIN) 0.1 % nasal spray INSTILL 2 SPRAYS IN EACH NOSTRIL TWICE DAILY AS NEEDED   Yes Abraham Macias MD   budesonide-formoterol (SYMBICORT) 160-4.5 MCG/ACT AERO TAKE 2 PUFFS BY MOUTH TWICE A DAY - RINSE MOUTH AFTER USE   Yes Abraham Macias MD   labetalol (NORMODYNE) 100 MG tablet TAKE 1 TABLET BY MOUTH TWICE A DAY FOR 30 DAYS 23  Yes Abraham Macias MD   metFORMIN (GLUCOPHAGE) 500 MG tablet Take 2 tablets by mouth daily (with breakfast) 23  Yes Abraham Macias MD       Current medications:    Current Facility-Administered Medications   Medication Dose Route Frequency Provider Last Rate Last Admin    oxytocin (PITOCIN) 30 units in 500 mL infusion  1-20 jyoti-units/min IntraVENous Continuous Gisele Grant MD 14 mL/hr at 10/01/24 1136 14

## 2024-10-01 NOTE — ANESTHESIA POSTPROCEDURE EVALUATION
Department of Anesthesiology  Postprocedure Note    Patient: Tiffanie Pierre  MRN: [id number]  YOB: 1985  Date of evaluation: 10/1/2024    Procedure Summary       Date: 10/01/24 [facility] L[facility]D OR    Anesthesia Start: 1315 Anesthesia Stop: 1753    Procedures:        SECTION      Labor Analgesia Diagnosis:       37 weeks gestation of pregnancy      (37 weeks gestation of pregnancy [Z3A.37])    Surgeons: Judy Lei MD Responsible Provider: Burak Santana MD    Anesthesia Type: MAC, Epidural ASA Status: 3            Anesthesia Type: MAC, Epidural    Ellie Phase I:      Ellie Phase II:      Anesthesia Post Evaluation    Patient location during evaluation: PACU  Patient participation: complete - patient participated  Level of consciousness: awake and alert  Airway patency: patent  Nausea & Vomiting: no nausea  Cardiovascular status: hemodynamically stable  Respiratory status: acceptable  Hydration status: euvolemic  Multimodal analgesia pain management approach  Pain management: adequate    No notable events documented.

## 2024-10-01 NOTE — PROGRESS NOTES
In to see patient, comfortable after epidural  /64   Pulse 80   Temp 98.3 °F (36.8 °C) (Oral)   Resp 18   Ht 1.6 m (5' 3\")   Wt 134.3 kg (296 lb)   LMP  (Exact Date)   SpO2 98%   BMI 52.43 kg/m²   , moderate variability, pos accels, rare variables, currently cat I but intermittently cat II After epidural placement had recurrent lates that resolved with repositioning.   SVE 6/60/-2  [place] mvus 180s       - we discussed no cervical change x 7 hours despite increasing pitocin. MVUs not adequate, but nearly so. We discussed my concern due to minimal descent and concern for larger baby. Most recent growth was 67% but prior to that was measuring 90% and she is a difficult scan due to habitus.  - discussed recommendation for  section due to no cervical change x >6 hours. She wanted time to discuss with family and requested additional hour with recheck which is reasonable given overall reassuring fetal status.         Back to bedside 16:10  /64   Pulse 80   Temp 98.3 °F (36.8 °C) (Oral)   Resp 18   Ht 1.6 m (5' 3\")   Wt 134.3 kg (296 lb)   LMP  (Exact Date)   SpO2 98%   BMI 52.43 kg/m²   , moderate variability, intermittent variable decels, cat I  SVE 6/60/-2  [place] q2-4 min on 22 pit. Inadequate mvus.    - discussed situation again. Mvus inadequate but have been increasing pitocin, and no change in 8 hours. She and her family are in agreement to proceed with  section. Reviewed risks of bleeding requiring blood transfusion, infection, damage to surrounding structures including bowel, bladder, blood vessels, nerves, ureter, uterus, tubes, ovaries, dvt/PE, need for additional procedures including hysterectomy. Consent was signed and she agreed to proceed.

## 2024-10-01 NOTE — LACTATION NOTE
This note was copied from a baby's chart.     10/01/24 1810   Visit Information   Lactation Consult Visit Type IP Initial Consult   Visit Length 30 minutes   Reason for Visit Normal  Visit;Education   Breast Feeding History/Assessment   Left Breast Soft  (No colostrum expressed)   Left Nipple Protrude  (Short)   Right Nipple Inverted   Right Breast Soft  (No colostrum expressed)   Breastfeeding History No   Left Side Feeding   Infant Latch Observations No latch was achieved   Infant Position Cross cradle  (Mother laid back)   Infant Response to Feeding Dimpling cheeks with suck   LATCH Documentation   Latch 0   Audible Swallowing 0   Type of Nipple 1   Comfort (Breast/Nipple) 2  (Short)   Hold (Positioning) 0   LATCH Score 3   Care Plan/Breast Care   Breast Care Lanolin provided;Pumping supply provided   Lactation Comment Observed baby's 1st feeding attempt. Mother has a fully inverted nipple on the right breast and short nipple on the left breast. No colostrum expressed from either breast. Observed baby latch very shallowly on the tip of the left nipple. Mother is very excited to breastfeed. Discussed her breast anatomy with her and the difficulty baby may have latching effectively. Supplementation may be indicated and mother would prefer donor milk. Discussed initiating pumping to stimulate her supply. Discussed concerns with Peds.   Equipment: Measured for 21mm flanges  Educated on pacfier     Plan:  Offer lots of skin to skin and access to the breast.  Feed baby at early signs of hunger every 2-3 hours.  Assure a deep latch, check that baby's lips are turned outward and use breast compression to keep baby actively feeding.  If baby is unable to latch supplementation may be indicated.  Initiate breast pumping.  Monitor wet and dirty diapers for signs of adequate intake.

## 2024-10-01 NOTE — ANESTHESIA PROCEDURE NOTES
Epidural Block    Patient location during procedure: OB  Start time: 10/1/2024 1:15 PM  End time: 10/1/2024 1:34 PM  Reason for block: labor epidural  Staffing  Performed: anesthesiologist   Anesthesiologist: Burak Santana MD  Performed by: Burak Santana MD  Authorized by: Burak Santana MD    Epidural  Patient position: sitting  Prep: ChloraPrep  Patient monitoring: cardiac monitor, continuous pulse ox and frequent blood pressure checks  Approach: midline  Location: L3-4  Injection technique: BETHEL saline  Provider prep: mask and sterile gloves  Needle  Needle type: Tuohy   Needle gauge: 17 G  Needle length: 3.5 in  Needle insertion depth: 7 cm  Catheter type: end hole  Catheter size: 19 G  Catheter at skin depth: 11 cm  Test dose: negativeCatheter Secured: tegaderm and tape  Assessment  Hemodynamics: stable  Attempts: 1  Outcomes: uncomplicated and patient tolerated procedure well  Additional Notes  DPE technique utilized  Preanesthetic Checklist  Completed: patient identified, IV checked, site marked, risks and benefits discussed, surgical/procedural consents, equipment checked, pre-op evaluation, timeout performed, anesthesia consent given, oxygen available, monitors applied/VS acknowledged, fire risk safety assessment completed and verbalized and blood product R/B/A discussed and consented

## 2024-10-01 NOTE — PROGRESS NOTES
Bedside and Verbal shift change report given to RODERICK Mortensen (oncoming nurse) by IZZY Cisneros (offgoing nurse). Report included the following information Nurse Handoff Report, Intake/Output, MAR, and Recent Results.     0753:  Dr. Grant at the bedside.  Patient FHR difficult to monitor. SVE performed patient 5/50/-3, IUPC and FSE placed.     0830: Dr. Lei at the bedside for FHT with recurrent variables.  SVE performed and patient is 6/80/-2.      1133:  Dr. Lei at the bedside.  SVE performed and patient is 6/60/-1. Verbal orders to continue to titrate pitocin until MVUs are adequate.     1258:  Dr. Lei at the bedside because FSE came off and having difficulty monitoring baby.  FSE placed.     1315:Anesthesia at bedside at 1315. Patient positioned to side of the bed, EFM & TOCO adjusted to accommodate sterile field. Difficulty tracing due to maternal position. Time out completed at 1317. Successful epidural is completed by Dr Santana. Patient is repositioned supine in bed with wedge under left hip. EFM and TOCO replaced and blood pressure frequency increased. [place] catheter placed and epidural continuous infusion is started.    1501:  Dr. Lei at the bedside to assess patient.  SVE performed and patient is unchanged.  Dr. Lei educated patient on  and discussed the option of  for delivery as labor has not progressed.  Patient would like time to discuss with family.     1609:  Dr. Lei at the bedside.  SVE performed and patient is unchanged.  C-section called for failure to progress.  MD discussed procedure with patient and answered questions.     1645:  Patient out of room to OR.    1705:  Delivery of liver baby boy by LTCS by Dr. Lei.

## 2024-10-02 LAB
ERYTHROCYTE [DISTWIDTH] IN BLOOD BY AUTOMATED COUNT: 15.5 % (ref 11.5–14.5)
HCT VFR BLD AUTO: 25.1 % (ref 35–47)
HGB BLD-MCNC: 7.9 G/DL (ref 11.5–16)
MCH RBC QN AUTO: 25 PG (ref 26–34)
MCHC RBC AUTO-ENTMCNC: 31.5 G/DL (ref 30–36.5)
MCV RBC AUTO: 79.4 FL (ref 80–99)
NRBC # BLD: 0 K/UL (ref 0–0.01)
NRBC BLD-RTO: 0 PER 100 WBC
PLATELET # BLD AUTO: 261 K/UL (ref 150–400)
PMV BLD AUTO: 11.3 FL (ref 8.9–12.9)
RBC # BLD AUTO: 3.16 M/UL (ref 3.8–5.2)
WBC # BLD AUTO: 16.1 K/UL (ref 3.6–11)

## 2024-10-02 PROCEDURE — 6370000000 HC RX 637 (ALT 250 FOR IP): Performed by: STUDENT IN AN ORGANIZED HEALTH CARE EDUCATION/TRAINING PROGRAM

## 2024-10-02 PROCEDURE — 6370000000 HC RX 637 (ALT 250 FOR IP): Performed by: OBSTETRICS & GYNECOLOGY

## 2024-10-02 PROCEDURE — 2580000003 HC RX 258: Performed by: STUDENT IN AN ORGANIZED HEALTH CARE EDUCATION/TRAINING PROGRAM

## 2024-10-02 PROCEDURE — 36415 COLL VENOUS BLD VENIPUNCTURE: CPT

## 2024-10-02 PROCEDURE — 6360000002 HC RX W HCPCS: Performed by: STUDENT IN AN ORGANIZED HEALTH CARE EDUCATION/TRAINING PROGRAM

## 2024-10-02 PROCEDURE — 2580000003 HC RX 258: Performed by: OBSTETRICS & GYNECOLOGY

## 2024-10-02 PROCEDURE — 85027 COMPLETE CBC AUTOMATED: CPT

## 2024-10-02 PROCEDURE — 1120000000 HC RM PRIVATE OB

## 2024-10-02 PROCEDURE — 94640 AIRWAY INHALATION TREATMENT: CPT

## 2024-10-02 PROCEDURE — 6360000002 HC RX W HCPCS: Performed by: OBSTETRICS & GYNECOLOGY

## 2024-10-02 RX ORDER — ENOXAPARIN SODIUM 100 MG/ML
40 INJECTION SUBCUTANEOUS DAILY
Status: DISCONTINUED | OUTPATIENT
Start: 2024-10-02 | End: 2024-10-04 | Stop reason: HOSPADM

## 2024-10-02 RX ADMIN — ACETAMINOPHEN 1000 MG: 500 TABLET ORAL at 04:09

## 2024-10-02 RX ADMIN — ENOXAPARIN SODIUM 40 MG: 100 INJECTION SUBCUTANEOUS at 11:49

## 2024-10-02 RX ADMIN — KETOROLAC TROMETHAMINE 30 MG: 30 INJECTION, SOLUTION INTRAMUSCULAR at 11:45

## 2024-10-02 RX ADMIN — KETOROLAC TROMETHAMINE 30 MG: 30 INJECTION, SOLUTION INTRAMUSCULAR at 00:17

## 2024-10-02 RX ADMIN — DOCUSATE SODIUM 100 MG: 100 CAPSULE, LIQUID FILLED ORAL at 22:10

## 2024-10-02 RX ADMIN — KETOROLAC TROMETHAMINE 30 MG: 30 INJECTION, SOLUTION INTRAMUSCULAR at 06:01

## 2024-10-02 RX ADMIN — IBUPROFEN 800 MG: 400 TABLET, FILM COATED ORAL at 19:44

## 2024-10-02 RX ADMIN — DOCUSATE SODIUM 100 MG: 100 CAPSULE, LIQUID FILLED ORAL at 08:58

## 2024-10-02 RX ADMIN — SODIUM CHLORIDE, PRESERVATIVE FREE 10 ML: 5 INJECTION INTRAVENOUS at 09:00

## 2024-10-02 RX ADMIN — ACETAMINOPHEN 1000 MG: 500 TABLET ORAL at 14:45

## 2024-10-02 RX ADMIN — SODIUM CHLORIDE, POTASSIUM CHLORIDE, SODIUM LACTATE AND CALCIUM CHLORIDE: 600; 310; 30; 20 INJECTION, SOLUTION INTRAVENOUS at 03:46

## 2024-10-02 RX ADMIN — CETIRIZINE HYDROCHLORIDE 10 MG: 10 TABLET, FILM COATED ORAL at 08:58

## 2024-10-02 RX ADMIN — ARFORMOTEROL TARTRATE: 15 SOLUTION RESPIRATORY (INHALATION) at 08:25

## 2024-10-02 RX ADMIN — PANTOPRAZOLE SODIUM 40 MG: 40 TABLET, DELAYED RELEASE ORAL at 08:58

## 2024-10-02 RX ADMIN — ACETAMINOPHEN 1000 MG: 500 TABLET ORAL at 22:09

## 2024-10-02 RX ADMIN — ARFORMOTEROL TARTRATE: 15 SOLUTION RESPIRATORY (INHALATION) at 21:42

## 2024-10-02 RX ADMIN — LABETALOL HYDROCHLORIDE 200 MG: 200 TABLET, FILM COATED ORAL at 22:09

## 2024-10-02 ASSESSMENT — PAIN DESCRIPTION - DESCRIPTORS
DESCRIPTORS: ACHING;CRAMPING;SORE
DESCRIPTORS: ACHING
DESCRIPTORS: ACHING;CRAMPING;SORE
DESCRIPTORS: SORE

## 2024-10-02 ASSESSMENT — PAIN DESCRIPTION - LOCATION
LOCATION: ABDOMEN
LOCATION: ABDOMEN;INCISION

## 2024-10-02 ASSESSMENT — PAIN SCALES - GENERAL
PAINLEVEL_OUTOF10: 5
PAINLEVEL_OUTOF10: 4
PAINLEVEL_OUTOF10: 2
PAINLEVEL_OUTOF10: 5
PAINLEVEL_OUTOF10: 0
PAINLEVEL_OUTOF10: 7
PAINLEVEL_OUTOF10: 7
PAINLEVEL_OUTOF10: 2

## 2024-10-02 ASSESSMENT — PAIN - FUNCTIONAL ASSESSMENT
PAIN_FUNCTIONAL_ASSESSMENT: ACTIVITIES ARE NOT PREVENTED
PAIN_FUNCTIONAL_ASSESSMENT: ACTIVITIES ARE NOT PREVENTED

## 2024-10-02 ASSESSMENT — PAIN DESCRIPTION - ORIENTATION
ORIENTATION: LOWER;MID
ORIENTATION: LOWER
ORIENTATION: LOWER
ORIENTATION: LOWER;MID

## 2024-10-02 NOTE — LACTATION NOTE
This note was copied from a baby's chart.     10/02/24 7627   Visit Information   Lactation Consult Visit Type IP Consult Follow Up   Visit Length 45 minutes   Referral Received From Lactation Consultant Follow-up   Reason for Visit Normal  Visit;Latch Problems;Education   Breast Feeding History/Assessment   Left Breast Soft  (Small drops of colostrum expressed)   Left Nipple Protrude  (Short)   Right Nipple Inverted  (Slightly everts after pumping)   Right Breast Soft  (Scant drop of colostrum expressed)   Breastfeeding History No   Left Side Feeding   Infant Latch Observations Rooting;Wide open mouth;No latch was achieved   Infant Position Cross cradle  (Mother laid back)   Infant Response to Feeding   ([place] breast)   LATCH Documentation   Latch 0  (Alelrt and licking nipple)   Audible Swallowing 0   Type of Nipple 2  (Short)   Comfort (Breast/Nipple) 2   Hold (Positioning) 0   LATCH Score 4   Care Plan/Breast Care   Breast Care Lanolin provided  (Pumping initiated)   Lactation Comment Baby is nearing 24 hours. Mother has been bottle feeding donor milk, however pumping had not been initiated. Reviewed the supply and demand concept of breast milk production. Discussed donor milk as being a bridge until mother's milk comes in and pumping every 2-3 hours is required.  Mother has good glandular tissue. She is happy about breastfeeding and supplying breast milk for her baby. States she will pump her breasts. A pumping session was observed. Demonstrated paced bottle feeding.  Equipment: Lansinoh double electric breast pump.  [place] oral assessment WDL  Educated on pacifier       Goals: 1) Feed baby, 2) Reach full milk supply, 3) Baby transfer well on the breast    Offer lots of skin to skin and access to the breast  Feed baby at early signs of hunger every 2-3 hours  Offer the left breast  prior to feedings as mother desires. Assure a deep latch, check that baby's lips are turned outward and use breast  compression to keep baby actively feeding  Offer a supplement of pumped breast milk/donor milk.   Pump breasts for 15 minutes  Monitor wet and dirty diapers for signs of adequate intake

## 2024-10-02 NOTE — ANESTHESIA POST-OP
Duramorph Follow-Up Note    1 Day Post-Op sp Procedure(s):   SECTION.    BP (!) 118/53   Pulse 76   Temp 98.4 °F (36.9 °C) (Oral)   Resp 16   Ht 1.6 m (5' 3\")   Wt 134.3 kg (296 lb)   LMP  (Exact Date)   SpO2 96%   Breastfeeding Unknown   BMI 52.43 kg/m² .      Patient is POD-1 S/P intrathecal duramorph.  Pain is well controlled  Patient reports no headache, fever, weakness or numbness.  Epidural/spinal tap site is clean, dry and intact.  No obvious Anesthesia complications noted.    Plan:    Pain management as per primary service.

## 2024-10-02 NOTE — PROGRESS NOTES
Post-Operative  Day 1    Tiffanie Pierre     Assessment: Post-Op day 1, stable from 1LTCS after arrest of dilation during Iol for CHTN    Plan:     - Routine post-operative care.  - CHTN - continue PO labetalol 200mg BID, has been hypotensive so may need to hold today  - BMI 51 - lovenox ppx   - The risks and benefits of the circumcision  procedure and anesthesia including: bleeding, infection, variability of cosmetic results were discussed at length with the mother. She is aware that future repeat procedures may be necessary. She gives informed consent to proceed as noted and her questions are answered.   - Postop hemoglobin 9.6 --> 7.9. Discussed IV Iron for today but she declines. States she will take PO Iron and eat iron rich foods   - Ambulate today.      Information for the patient's :  Gabby, Josef Tiffanie SINGH [[id number]]   , Low Transverse  Patient doing well without significant complaint.  Tolerating diet.  [place] still in place. Has not yet gotten out of bed    Vitals:  BP (!) 118/53   Pulse 76   Temp 98.4 °F (36.9 °C) (Oral)   Resp 16   Ht 1.6 m (5' 3\")   Wt 134.3 kg (296 lb)   LMP  (Exact Date)   SpO2 96%   Breastfeeding Unknown   BMI 52.43 kg/m²   Temp (24hrs), Av.2 °F (36.8 °C), Min:97.6 °F (36.4 °C), Max:98.4 °F (36.9 °C)      Last 24hr Input/Output:    Intake/Output Summary (Last 24 hours) at 10/2/2024 0945  Last data filed at 10/2/2024 0409  Gross per 24 hour   Intake 1496.96 ml   Output 1930 ml   Net -433.04 ml          Exam:     Patient without distress.               Fundus firm, nontender per nursing fundal checks.  Incision bandaged, clean, dry, intact.              Perineum with normal lochia noted per nursing assessment.              Lower extremities are negative for pathological edema.    Labs:   Lab Results   Component Value Date/Time    WBC 16.1 10/02/2024 04:30 AM    WBC 13.2 2024 09:03 AM    HGB 7.9 10/02/2024 04:30 AM    HGB 9.6 2024

## 2024-10-02 NOTE — PROGRESS NOTES
Patient assisted to the bathroom with 2 RN's.  Bathed with Dial soap, gopal care done and green wipes used for catheter care.  Safely assisted back to bed by 2 RN's.  Tolerated well.

## 2024-10-03 PROCEDURE — 6360000002 HC RX W HCPCS: Performed by: OBSTETRICS & GYNECOLOGY

## 2024-10-03 PROCEDURE — 94640 AIRWAY INHALATION TREATMENT: CPT

## 2024-10-03 PROCEDURE — 6370000000 HC RX 637 (ALT 250 FOR IP): Performed by: OBSTETRICS & GYNECOLOGY

## 2024-10-03 PROCEDURE — 6370000000 HC RX 637 (ALT 250 FOR IP): Performed by: STUDENT IN AN ORGANIZED HEALTH CARE EDUCATION/TRAINING PROGRAM

## 2024-10-03 PROCEDURE — 6360000002 HC RX W HCPCS: Performed by: STUDENT IN AN ORGANIZED HEALTH CARE EDUCATION/TRAINING PROGRAM

## 2024-10-03 PROCEDURE — 1120000000 HC RM PRIVATE OB

## 2024-10-03 RX ORDER — POLYETHYLENE GLYCOL 3350 17 G/17G
17 POWDER, FOR SOLUTION ORAL DAILY
Status: DISCONTINUED | OUTPATIENT
Start: 2024-10-03 | End: 2024-10-04 | Stop reason: HOSPADM

## 2024-10-03 RX ORDER — POLYETHYLENE GLYCOL 3350 17 G/17G
17 POWDER, FOR SOLUTION ORAL DAILY
Status: DISCONTINUED | OUTPATIENT
Start: 2024-10-03 | End: 2024-10-03

## 2024-10-03 RX ADMIN — LABETALOL HYDROCHLORIDE 200 MG: 200 TABLET, FILM COATED ORAL at 08:20

## 2024-10-03 RX ADMIN — ENOXAPARIN SODIUM 40 MG: 100 INJECTION SUBCUTANEOUS at 08:20

## 2024-10-03 RX ADMIN — DOCUSATE SODIUM 100 MG: 100 CAPSULE, LIQUID FILLED ORAL at 08:20

## 2024-10-03 RX ADMIN — DOCUSATE SODIUM 100 MG: 100 CAPSULE, LIQUID FILLED ORAL at 21:42

## 2024-10-03 RX ADMIN — CETIRIZINE HYDROCHLORIDE 10 MG: 10 TABLET, FILM COATED ORAL at 08:20

## 2024-10-03 RX ADMIN — OXYCODONE 5 MG: 5 TABLET ORAL at 16:22

## 2024-10-03 RX ADMIN — POLYETHYLENE GLYCOL 3350 17 G: 17 POWDER, FOR SOLUTION ORAL at 19:14

## 2024-10-03 RX ADMIN — OXYCODONE 5 MG: 5 TABLET ORAL at 19:14

## 2024-10-03 RX ADMIN — ACETAMINOPHEN 1000 MG: 500 TABLET ORAL at 14:08

## 2024-10-03 RX ADMIN — OXYCODONE 5 MG: 5 TABLET ORAL at 11:45

## 2024-10-03 RX ADMIN — OXYCODONE 10 MG: 5 TABLET ORAL at 23:38

## 2024-10-03 RX ADMIN — OXYCODONE 5 MG: 5 TABLET ORAL at 02:54

## 2024-10-03 RX ADMIN — ACETAMINOPHEN 1000 MG: 500 TABLET ORAL at 06:17

## 2024-10-03 RX ADMIN — PANTOPRAZOLE SODIUM 40 MG: 40 TABLET, DELAYED RELEASE ORAL at 08:20

## 2024-10-03 RX ADMIN — LABETALOL HYDROCHLORIDE 200 MG: 200 TABLET, FILM COATED ORAL at 21:43

## 2024-10-03 RX ADMIN — ACETAMINOPHEN 1000 MG: 500 TABLET ORAL at 21:42

## 2024-10-03 RX ADMIN — ARFORMOTEROL TARTRATE: 15 SOLUTION RESPIRATORY (INHALATION) at 19:27

## 2024-10-03 ASSESSMENT — PAIN SCALES - GENERAL
PAINLEVEL_OUTOF10: 6
PAINLEVEL_OUTOF10: 5
PAINLEVEL_OUTOF10: 4
PAINLEVEL_OUTOF10: 5
PAINLEVEL_OUTOF10: 7
PAINLEVEL_OUTOF10: 4
PAINLEVEL_OUTOF10: 4
PAINLEVEL_OUTOF10: 6

## 2024-10-03 ASSESSMENT — PAIN DESCRIPTION - LOCATION
LOCATION: ABDOMEN;INCISION
LOCATION: ABDOMEN;INCISION
LOCATION: ABDOMEN
LOCATION: ABDOMEN;INCISION
LOCATION: ABDOMEN
LOCATION: ABDOMEN
LOCATION: ABDOMEN;INCISION

## 2024-10-03 ASSESSMENT — PAIN DESCRIPTION - ORIENTATION
ORIENTATION: LOWER;MID
ORIENTATION: LOWER
ORIENTATION: LOWER;MID
ORIENTATION: LOWER

## 2024-10-03 ASSESSMENT — PAIN - FUNCTIONAL ASSESSMENT
PAIN_FUNCTIONAL_ASSESSMENT: PREVENTS OR INTERFERES WITH MANY ACTIVE NOT PASSIVE ACTIVITIES

## 2024-10-03 ASSESSMENT — PAIN DESCRIPTION - DESCRIPTORS
DESCRIPTORS: ACHING;CRAMPING;SORE
DESCRIPTORS: CRAMPING;DISCOMFORT
DESCRIPTORS: ACHING;CRAMPING;SORE
DESCRIPTORS: ACHING;CRAMPING;SORE
DESCRIPTORS: CRAMPING;BURNING
DESCRIPTORS: ACHING;CRAMPING;SORE

## 2024-10-03 NOTE — PROGRESS NOTES
RN went to administer medication this morning and patient stated that she has had ulcers in the past due to taking NSAIDS. She does not want to take any ibuprofen or take risks of injury to her stomach so RN discontinued Motrin order and will stick with oxy and tylenol per postpartum order set.

## 2024-10-03 NOTE — PROGRESS NOTES
Post-Partum Day Number 2 Progress Note    Tiffanie Pierre     Assessment: Doing well, post partum day 2 s/p PLTCS for arrest of dilation     Plan:   - Cont routine postop care  - CHTN: BP meds help postpartum as she has been hypo-normotensive. Bps still stable today.   - BMI 51, cont lovenox while in house  - Plan for circ once infant cleared by peds, planning speech eval today for poor feeding  - postop H/H 9-6 - 7.9, plan oral iron at discharge  - DC home tomorrow    Information for the patient's :  Josef Pierre [[id number]]   , Low Transverse Patient doing well without significant complaint.  Voiding without difficulty, normal lochia. Ambulating without difficulty.     Vitals:  /69   Pulse 99   Temp 98.3 °F (36.8 °C) (Oral)   Resp 16   Ht 1.6 m (5' 3\")   Wt 134.3 kg (296 lb)   LMP  (Exact Date)   SpO2 99%   Breastfeeding Unknown   BMI 52.43 kg/m²   Temp (24hrs), Av.4 °F (36.9 °C), Min:97.8 °F (36.6 °C), Max:99 °F (37.2 °C)      Exam:        Patient without distress.                Fundus firm, nontender per nursing fundal checks. Incision c/d/I with silverlon bandage in place                Perineum with normal lochia noted per nursing assessment                Lower extremities are negative for pathological edema    Labs:     Lab Results   Component Value Date/Time    WBC 16.1 10/02/2024 04:30 AM    WBC 13.2 2024 09:03 AM    HGB 7.9 10/02/2024 04:30 AM    HGB 9.6 2024 09:03 AM    HCT 25.1 10/02/2024 04:30 AM    HCT 29.9 2024 09:03 AM     10/02/2024 04:30 AM     2024 09:03 AM       No results found for this or any previous visit (from the past 24 hour(s)).          No

## 2024-10-04 VITALS
TEMPERATURE: 98.7 F | HEIGHT: 63 IN | RESPIRATION RATE: 18 BRPM | HEART RATE: 100 BPM | BODY MASS INDEX: 51.91 KG/M2 | OXYGEN SATURATION: 97 % | DIASTOLIC BLOOD PRESSURE: 72 MMHG | SYSTOLIC BLOOD PRESSURE: 127 MMHG | WEIGHT: 293 LBS

## 2024-10-04 PROCEDURE — 94640 AIRWAY INHALATION TREATMENT: CPT

## 2024-10-04 PROCEDURE — 6370000000 HC RX 637 (ALT 250 FOR IP): Performed by: STUDENT IN AN ORGANIZED HEALTH CARE EDUCATION/TRAINING PROGRAM

## 2024-10-04 PROCEDURE — 6370000000 HC RX 637 (ALT 250 FOR IP): Performed by: OBSTETRICS & GYNECOLOGY

## 2024-10-04 PROCEDURE — 6360000002 HC RX W HCPCS: Performed by: STUDENT IN AN ORGANIZED HEALTH CARE EDUCATION/TRAINING PROGRAM

## 2024-10-04 PROCEDURE — 6360000002 HC RX W HCPCS: Performed by: OBSTETRICS & GYNECOLOGY

## 2024-10-04 RX ORDER — DOCUSATE SODIUM 100 MG/1
100 CAPSULE, LIQUID FILLED ORAL 2 TIMES DAILY
Qty: 60 CAPSULE | Refills: 0 | Status: SHIPPED | OUTPATIENT
Start: 2024-10-04 | End: 2024-11-03

## 2024-10-04 RX ORDER — ACETAMINOPHEN 500 MG
1000 TABLET ORAL EVERY 8 HOURS PRN
Qty: 120 TABLET | Refills: 3 | Status: SHIPPED
Start: 2024-10-04

## 2024-10-04 RX ORDER — OXYCODONE HYDROCHLORIDE 5 MG/1
5 TABLET ORAL EVERY 6 HOURS PRN
Qty: 12 TABLET | Refills: 0 | Status: SHIPPED | OUTPATIENT
Start: 2024-10-04 | End: 2024-10-07

## 2024-10-04 RX ORDER — LABETALOL 200 MG/1
200 TABLET, FILM COATED ORAL 2 TIMES DAILY
Qty: 180 TABLET | Refills: 3 | Status: SHIPPED | OUTPATIENT
Start: 2024-10-04 | End: 2025-09-29

## 2024-10-04 RX ADMIN — POLYETHYLENE GLYCOL 3350 17 G: 17 POWDER, FOR SOLUTION ORAL at 09:12

## 2024-10-04 RX ADMIN — ACETAMINOPHEN 1000 MG: 500 TABLET ORAL at 14:32

## 2024-10-04 RX ADMIN — CETIRIZINE HYDROCHLORIDE 10 MG: 10 TABLET, FILM COATED ORAL at 09:11

## 2024-10-04 RX ADMIN — ARFORMOTEROL TARTRATE: 15 SOLUTION RESPIRATORY (INHALATION) at 10:12

## 2024-10-04 RX ADMIN — OXYCODONE 5 MG: 5 TABLET ORAL at 06:14

## 2024-10-04 RX ADMIN — PANTOPRAZOLE SODIUM 40 MG: 40 TABLET, DELAYED RELEASE ORAL at 09:11

## 2024-10-04 RX ADMIN — LABETALOL HYDROCHLORIDE 200 MG: 200 TABLET, FILM COATED ORAL at 09:11

## 2024-10-04 RX ADMIN — ACETAMINOPHEN 1000 MG: 500 TABLET ORAL at 06:14

## 2024-10-04 RX ADMIN — ENOXAPARIN SODIUM 40 MG: 100 INJECTION SUBCUTANEOUS at 09:11

## 2024-10-04 RX ADMIN — OXYCODONE 10 MG: 5 TABLET ORAL at 16:15

## 2024-10-04 RX ADMIN — DOCUSATE SODIUM 100 MG: 100 CAPSULE, LIQUID FILLED ORAL at 09:11

## 2024-10-04 ASSESSMENT — PAIN SCALES - GENERAL: PAINLEVEL_OUTOF10: 5

## 2024-10-04 ASSESSMENT — PAIN DESCRIPTION - LOCATION: LOCATION: ABDOMEN;INCISION

## 2024-10-04 ASSESSMENT — PAIN DESCRIPTION - ORIENTATION: ORIENTATION: LOWER;MID

## 2024-10-04 ASSESSMENT — PAIN DESCRIPTION - DESCRIPTORS: DESCRIPTORS: ACHING;CRAMPING;SORE

## 2024-10-04 NOTE — PROGRESS NOTES
Post-Operative  Day 3    Tiffanie SAMANTHA Gabby       Assessment: Post-Op day 3, doing well s/p PLTCS for arrest of dilation.  Pregnancy c/b cHTN on labetalol 200mg BID.  Hgb 9.6 > 7.9, pt declined IV iron.    Plan:   - Discharge home today.  - Follow up in office in 1 week(s) with [facility].  - Pain medication prescription(s) sent.  - Questions answered.      Information for the patient's :  Josef Pierre [[id number]]   , Low Transverse Patient doing well without significant complaint. Tolerating regular diet.  Ambulating.  Voiding without difficulty.    Vitals:  /64   Pulse 99   Temp 98.4 °F (36.9 °C) (Oral)   Resp 16   Ht 1.6 m (5' 3\")   Wt 134.3 kg (296 lb)   LMP  (Exact Date)   SpO2 100%   Breastfeeding Unknown   BMI 52.43 kg/m²   Temp (24hrs), Av.7 °F (37.1 °C), Min:98.4 °F (36.9 °C), Max:98.8 °F (37.1 °C)        Exam:       Patient without distress.                 Fundus firm, nontender per nursing fundal checks.     Incision bandaged. Clean, dry, intact.                Perineum with normal lochia noted per nursing assessment.                Lower extremities are negative for pathological edema.    Labs:   Lab Results   Component Value Date/Time    WBC 16.1 10/02/2024 04:30 AM    WBC 13.2 2024 09:03 AM    HGB 7.9 10/02/2024 04:30 AM    HGB 9.6 2024 09:03 AM    HCT 25.1 10/02/2024 04:30 AM    HCT 29.9 2024 09:03 AM     10/02/2024 04:30 AM     2024 09:03 AM       No results found for this or any previous visit (from the past 24 hour(s)).

## 2024-10-04 NOTE — LACTATION NOTE
This note was copied from a baby's chart.     10/04/24 1030   Visit Information   Lactation Consult Visit Type IP Consult Follow Up   Visit Length 30 minutes   Referral Received From Lactation Consultant Follow-up   Reason for Visit Normal  Visit;Latch Problems;Education   Breast Feeding History/Assessment   Left Breast Filling   Left Nipple Protrude   Right Nipple Inverted;Protrude with stimulation   Right Breast Soft   Breastfeeding History No   Feeding Assessment: Infant Factors   Infant Supplementation Expressed Breast Milk    Formula Type Similac Pro Total Comfort   Care Plan/Breast Care   Breast Care Using breast pump;Lanolin provided   Lactation Comment Baby is in his 3rd DOL. Transitioned to formula feeding from donor milk yesterday. Mother is pumping to stimulate and grow her supply. A pumping session was observed. She pumped 15mls from the left breast and drops from the right breast. Her left nipple is signifantly smaller than the right and inverted. It everts with stimulation. Will return at the next feeding for breastfeeding assessment.  Equipment: Lansinoh 3.0 Smart Pump; Measured for a 19-21mm flange on the left and a 17mm on the right; Mother has a 17mm flange that is compatible with her breast pump; Supplied with a hand pump   oral assessment: Chin slightly recessed  Educated on pacifier     Goals: 1) Feed baby, 2) Reach full milk supply, 3) Baby transfer well on the breast    Offer lots of skin to skin and access to the breast  Feed baby at early signs of hunger every 2-3 hours  Assure a deep latch, check that baby's lips are turned outward and use breast compression to keep baby actively feeding  Offer a supplement of pumped breast milk via paced bottle feeding  Pump breasts for 15 minutes  Monitor wet and dirty diapers for signs of adequate intake  Discuss feeding plan with Pediatrician and make adjustments as needed

## 2024-10-04 NOTE — PROGRESS NOTES
Reviewed discharge instructions and follow-up appointment with patient and partner. Opportunities for questions provided; all questions answered. Extra perineal supplies given to patient. Patient is ready for discharge.

## 2024-10-04 NOTE — DISCHARGE SUMMARY
Obstetrical Discharge Summary     Name: Tiffanie Pierre MRN: [id number]  SSN: [id number]    YOB: 1985  Age: 39 y.o.  Sex: female      Admit Date: 2024    Discharge Date: 10/4/2024     Admitting Physician: Yuli Perez MD     Attending Physician:  Judy Lei, *     Admission Diagnoses: Chronic hypertension affecting pregnancy [O10.919]    Discharge Diagnoses:   Information for the patient's :  Josef Pierre [[id number]]   @[id number]@     Additional Diagnoses:  No components found for: \"OBEXTABORH\", \"OBEXTABSCRN\", \"OBEXTRUBELLA\", \"OBEXTGRBS\"    Hospital Course: Normal hospital course following the delivery.    Patient Instructions:   Current Discharge Medication List        START taking these medications    Details   oxyCODONE (ROXICODONE) 5 MG immediate release tablet Take 1 tablet by mouth every 6 hours as needed for Pain for up to 3 days. Max Daily Amount: 20 mg  Qty: 12 tablet, Refills: 0    Comments: Reduce doses taken as pain becomes manageable  Associated Diagnoses: S/P       acetaminophen (TYLENOL) 500 MG tablet Take 2 tablets by mouth every 8 hours as needed for Pain  Qty: 120 tablet, Refills: 3      docusate sodium (COLACE) 100 MG capsule Take 1 capsule by mouth 2 times daily  Qty: 60 capsule, Refills: 0           CONTINUE these medications which have CHANGED    Details   labetalol (NORMODYNE) 200 MG tablet Take 1 tablet by mouth 2 times daily  Qty: 180 tablet, Refills: 3           CONTINUE these medications which have NOT CHANGED    Details   omeprazole (PRILOSEC) 20 MG delayed release capsule Take 1 capsule by mouth daily      Prenatal Vit-Fe Fumarate-FA (PRENATAL PO) Take by mouth      cetirizine (ZYRTEC) 10 MG tablet Take 1 tablet by mouth daily      albuterol sulfate HFA (PROVENTIL;VENTOLIN;PROAIR) 108 (90 Base) MCG/ACT inhaler INHALE 2 PUFFS BY MOUTH EVERY 4 HOURS AS NEEDED AND 20-30 MIN BEFORE EXERCISE      albuterol (PROVENTIL) (2.5  MG/3ML) 0.083% nebulizer solution USE 1 VIAL VIA NEBULIZER ONCE EVERY 4 HRS AS NEEDED      azelastine (ASTELIN) 0.1 % nasal spray INSTILL 2 SPRAYS IN EACH NOSTRIL TWICE DAILY AS NEEDED      budesonide-formoterol (SYMBICORT) 160-4.5 MCG/ACT AERO TAKE 2 PUFFS BY MOUTH TWICE A DAY - RINSE MOUTH AFTER USE      metFORMIN (GLUCOPHAGE) 500 MG tablet Take 2 tablets by mouth daily (with breakfast)           STOP taking these medications       aspirin 81 MG chewable tablet Comments:   Reason for Stopping:         Ferrous Sulfate Dried (FERROUS SULFATE IRON PO) Comments:   Reason for Stopping:         ascorbic acid (VITAMIN C) 100 MG tablet Comments:   Reason for Stopping:         Cholecalciferol 50 MCG (2000 UT) CAPS Comments:   Reason for Stopping:         hydroCHLOROthiazide (HYDRODIURIL) 25 MG tablet Comments:   Reason for Stopping:               Disposition at Discharge: Home or self care    Condition at Discharge: Stable    Reference my discharge instructions.    No follow-ups on file.     Signed By:  Keeley Maharaj MD     October 4, 2024

## (undated) DEVICE — 1LYRTR 16FR10ML 100%SILI SNAP: Brand: MEDLINE INDUSTRIES, INC.

## (undated) DEVICE — C-SECTION II-LF: Brand: MEDLINE INDUSTRIES, INC.

## (undated) DEVICE — Z DISCONTINUED NO SUB IDED SPONGE LAPAROTOMY W18XL18IN WHITE STRUNG RADIOPAQUE STERILE

## (undated) DEVICE — DEVON™ KNEE AND BODY STRAP 60" X 3" (1.5 M X 7.6 CM): Brand: DEVON

## (undated) DEVICE — Z DISCONTINUEDSOLUTION PREP 2OZ 10% POVIDONE IOD SCR CAP BTL

## (undated) DEVICE — SOLUTION IRRIGATION H2O 0797305] ICU MEDICAL INC]

## (undated) DEVICE — STAPLER SKIN SQ 30 ABSRB STPL DISP INSORB ORDER VIA PHONE OR EMAIL

## (undated) DEVICE — KENDALL SCD EXPRESS SLEEVES, KNEE LENGTH, MEDIUM: Brand: KENDALL SCD

## (undated) DEVICE — SUTURE MONOCRYL SZ 0 L36IN ABSRB VLT L48MM CTX 1/2 CIR Y398H

## (undated) DEVICE — SUTURE VICRYL SZ 0 L36IN ABSRB UD L40MM CT 1/2 CIR TAPERPOINT J958H

## (undated) DEVICE — LARGE, DISPOSABLE ALEXIS O C-SECTION PROTECTOR - RETRACTOR: Brand: ALEXIS ® O C-SECTION PROTECTOR - RETRACTOR

## (undated) DEVICE — CYSTOSCOPY PACK: Brand: CONVERTORS

## (undated) DEVICE — SUTURE MONOCRYL SZ 0 L36IN ABSRB UD L36MM CT-1 1/2 CIR Y946H

## (undated) DEVICE — GOWN,SIRUS,FABRNF,XL,20/CS: Brand: MEDLINE

## (undated) DEVICE — INFECTION CONTROL KIT SYS

## (undated) DEVICE — PAD,ABDOMINAL,5"X9",ST,LF,25/BX: Brand: MEDLINE INDUSTRIES, INC.

## (undated) DEVICE — ELECTRODE PT RET AD L9FT HI MOIST COND ADH HYDRGEL CORDED

## (undated) DEVICE — SOLUTION IRRIG 1000ML 0.9% SOD CHL USP POUR PLAS BTL

## (undated) DEVICE — PENCIL ES L3M BTTN SWCH S STL HEX LOK BLDE ELECTRD HOLSTER

## (undated) DEVICE — CYSTO/BLADDER IRRIGATION SET, REGULATING CLAMP

## (undated) DEVICE — C-SECTION MRMC: Brand: MEDLINE INDUSTRIES, INC.

## (undated) DEVICE — Z DISCONTINUED NO SUB SUTURE PLN GUT SZ 3-0 L27IN ABSRB YELLOWISH TAN L70MM XLH 52T

## (undated) DEVICE — SOLIDIFIER FLD 3.2OZ 3000CC TRAD IN BTL LIQUI-LOC

## (undated) DEVICE — GUIDEWIRE ENDOSCP L150CM DIA0.035IN TIP L15CM BENT PTFE

## (undated) DEVICE — TUBING, SUCTION, 1/4" X 10', STRAIGHT: Brand: MEDLINE

## (undated) DEVICE — HANDLE LT SNAP ON ULT DURABLE LENS FOR TRUMPF ALC DISPOSABLE

## (undated) DEVICE — STERILE LATEX POWDER-FREE SURGICAL GLOVESWITH NITRILE COATING: Brand: PROTEXIS

## (undated) DEVICE — BAG COLLECTION FLD OR-TBL NS --

## (undated) DEVICE — CLEANER ES TIP W2XL2IN ADH BK RADPQ FOR S STL ELECTRD

## (undated) DEVICE — APPLICATOR MEDICATED 26 CC SOLUTION HI LT ORNG CHLORAPREP

## (undated) DEVICE — SKIN MARKER,REGULAR TIP WITH RULER AND LABELS: Brand: DEVON

## (undated) DEVICE — CANISTER, RIGID, 3000CC: Brand: MEDLINE INDUSTRIES, INC.